# Patient Record
Sex: FEMALE | Race: WHITE | NOT HISPANIC OR LATINO | Employment: FULL TIME | URBAN - METROPOLITAN AREA
[De-identification: names, ages, dates, MRNs, and addresses within clinical notes are randomized per-mention and may not be internally consistent; named-entity substitution may affect disease eponyms.]

---

## 2023-04-01 DIAGNOSIS — G43.909 MIGRAINE, UNSPECIFIED, NOT INTRACTABLE, WITHOUT STATUS MIGRAINOSUS: ICD-10-CM

## 2023-04-04 RX ORDER — PROPRANOLOL HYDROCHLORIDE 60 MG/1
TABLET ORAL
Qty: 90 TABLET | Refills: 2 | Status: SHIPPED | OUTPATIENT
Start: 2023-04-04 | End: 2023-04-19 | Stop reason: SDUPTHER

## 2023-04-19 ENCOUNTER — OFFICE VISIT (OUTPATIENT)
Dept: PRIMARY CARE | Facility: CLINIC | Age: 48
End: 2023-04-19
Payer: COMMERCIAL

## 2023-04-19 DIAGNOSIS — K21.9 GASTROESOPHAGEAL REFLUX DISEASE WITHOUT ESOPHAGITIS: ICD-10-CM

## 2023-04-19 DIAGNOSIS — J32.0 CHRONIC MAXILLARY SINUSITIS: ICD-10-CM

## 2023-04-19 DIAGNOSIS — G43.009 MIGRAINE WITHOUT AURA AND WITHOUT STATUS MIGRAINOSUS, NOT INTRACTABLE: Primary | ICD-10-CM

## 2023-04-19 DIAGNOSIS — G43.909 MIGRAINE, UNSPECIFIED, NOT INTRACTABLE, WITHOUT STATUS MIGRAINOSUS: ICD-10-CM

## 2023-04-19 PROBLEM — F41.9 ANXIETY: Status: ACTIVE | Noted: 2023-04-19

## 2023-04-19 PROBLEM — J32.9 CHRONIC SINUSITIS: Status: ACTIVE | Noted: 2023-04-19

## 2023-04-19 PROCEDURE — 99212 OFFICE O/P EST SF 10 MIN: CPT | Performed by: INTERNAL MEDICINE

## 2023-04-19 RX ORDER — SERTRALINE HYDROCHLORIDE 25 MG/1
25 TABLET, FILM COATED ORAL DAILY
COMMUNITY
Start: 2023-02-20 | End: 2023-04-19 | Stop reason: ALTCHOICE

## 2023-04-19 RX ORDER — RIMEGEPANT SULFATE 75 MG/75MG
TABLET, ORALLY DISINTEGRATING ORAL
COMMUNITY
Start: 2022-06-08

## 2023-04-19 RX ORDER — ESTRADIOL 0.04 MG/D
PATCH, EXTENDED RELEASE TRANSDERMAL
COMMUNITY
Start: 2019-03-04

## 2023-04-19 RX ORDER — PANTOPRAZOLE SODIUM 40 MG/1
1 TABLET, DELAYED RELEASE ORAL
COMMUNITY
Start: 2022-12-07

## 2023-04-19 RX ORDER — PROPRANOLOL HYDROCHLORIDE 20 MG/1
10 TABLET ORAL DAILY
Qty: 15 TABLET | Refills: 2 | Status: SHIPPED | OUTPATIENT
Start: 2023-04-19 | End: 2023-06-20 | Stop reason: SINTOL

## 2023-04-19 NOTE — PROGRESS NOTES
Subjective   Patient ID: Shyann Huerta is a 48 y.o. female who presents for a follow-up visit.    HPI   The patient reports a history of a near constant pressure tightness in the right breast region x3 weeks up until the past few days.  She reports that the discomfort occurred with any movement as well as with inspiration.  She reports that the discomfort was not affected by oral intake.  She reports no radiation of discomfort.  She reports no associated diaphoresis, shortness of breath, nausea, vomiting, belching, presyncope.    The patient reports that she continues to have to use Excedrin Migraine once or twice a day so that she does not have daily headaches.  She discontinued propranolol several weeks ago and never started Nurtec that Dr. Colon recommended..  She also weaned herself off of Zoloft within the past several weeks.  Review of Systems    Objective   There were no vitals taken for this visit.    Physical Exam  Lungs-clear  Cardiac-rate normal, rhythm regular, no murmurs, no JVD  Musculoskeletal  Chest-no erythema or swelling.  Full range of motion in all directions of motion with reproduction of the patient's chest discomfort to a mild extent on rotation to the left.  Full range of motion with no reproduction of discomfort in all other directions of motion.  Palpation revealed no tenderness or increase in warmth  Assessment/Plan        Assessment  History of near constant pressure tightness in the right breast region-may be secondary to neuromuscular chest discomfort.  Chronic daily headache  Anxiety  Plan  I did ask the patient to begin use of Nurtec 75 mg p.o. every other day  I told the patient to restart propranolol but at a dose of 10 mg every evening.  I told the patient that she should use Advil 600 mg p.o. every 6 hours as needed pain or headache and that she should discontinue Excedrin Migraine.  The patient will return for a follow-up visit in 1 month

## 2023-04-21 ENCOUNTER — APPOINTMENT (OUTPATIENT)
Dept: PRIMARY CARE | Facility: CLINIC | Age: 48
End: 2023-04-21
Payer: COMMERCIAL

## 2023-04-21 DIAGNOSIS — G43.909 MIGRAINE, UNSPECIFIED, NOT INTRACTABLE, WITHOUT STATUS MIGRAINOSUS: ICD-10-CM

## 2023-04-21 RX ORDER — PROPRANOLOL HYDROCHLORIDE 10 MG/1
10 TABLET ORAL 3 TIMES DAILY
COMMUNITY
End: 2023-04-21 | Stop reason: SDUPTHER

## 2023-04-21 RX ORDER — PROPRANOLOL HYDROCHLORIDE 10 MG/1
10 TABLET ORAL 3 TIMES DAILY
Qty: 90 TABLET | Refills: 1 | Status: SHIPPED | OUTPATIENT
Start: 2023-04-21 | End: 2023-05-16

## 2023-05-16 DIAGNOSIS — G43.909 MIGRAINE, UNSPECIFIED, NOT INTRACTABLE, WITHOUT STATUS MIGRAINOSUS: ICD-10-CM

## 2023-05-16 RX ORDER — PROPRANOLOL HYDROCHLORIDE 10 MG/1
TABLET ORAL
Qty: 90 TABLET | Refills: 1 | Status: SHIPPED | OUTPATIENT
Start: 2023-05-16 | End: 2023-06-16

## 2023-06-13 DIAGNOSIS — G43.909 MIGRAINE, UNSPECIFIED, NOT INTRACTABLE, WITHOUT STATUS MIGRAINOSUS: ICD-10-CM

## 2023-06-15 NOTE — PROGRESS NOTES
Subjective   Patient ID: Shyann Huerta is a 48 y.o. female who presents for follow-up visit    HPI   The patient reports that she developed significant anxiety 3 to 4 weeks after her last office visit.  As a result, she restarted Zoloft at a dose of 12.5 mg daily and increase the dosage to 25 mg daily 1.5 weeks ago.  She reports a dramatic decrease in anxiety since restarting the Zoloft.  She still reports that she has to take 2 Excedrin migraines per day to prevent headache.  She did not begin use of Nurtec but is using propranolol 10 mg daily.  Review of Systems    Objective   There were no vitals taken for this visit.    Physical Exam  Neurologic  Mental status-alert and oriented x3   Cranial nerves-2 through 12 grossly intact, no visual field abnormalities  Motor-no pronator drift noted, strength-5/5 in all muscle groups tested, , no tremor noted.  No bradykinesia noted.  No rigidity noted.  Negative pull test  Sensory-Light touch sensation fully intact  Pinprick sensation fully intact  Vibratory sensation fully intact  Cerebellar-no truncal ataxia, good coordination finger-nose testing,, good coordination heel-to-shin testing, normal rapid alternating movements  Romberg negative, no abnormality in tandem gait  Reflexes-2+/4 bilaterally except left ankle jerk 1+/4  Assessment/Plan     Assessment  Allergic rhinitis  Chronic sinusitis  Gastroesophageal reflux  Gastritis December 2020  Acne vulgaris  Seborrheic dermatitis  Mild acne rosacea  Chronic daily headache  Anxiety.  Depression    Plan  I have urged the patient to begin use of Nurtec 75 mg p.o. every other day and I told her to try to limit her Excedrin Migraine usage to 1 tablet daily at least for the next 4 weeks.  I told her to continue use of Zoloft at a dose of 25 mg daily.  I told the patient to call me in 1 month with her condition and she will keep her regular scheduled complete physical examination in August

## 2023-06-16 RX ORDER — PROPRANOLOL HYDROCHLORIDE 10 MG/1
TABLET ORAL
Qty: 270 TABLET | Refills: 1 | Status: SHIPPED | OUTPATIENT
Start: 2023-06-16 | End: 2023-12-18

## 2023-06-20 ENCOUNTER — OFFICE VISIT (OUTPATIENT)
Dept: PRIMARY CARE | Facility: CLINIC | Age: 48
End: 2023-06-20
Payer: COMMERCIAL

## 2023-06-20 VITALS
WEIGHT: 148.13 LBS | BODY MASS INDEX: 27.09 KG/M2 | HEART RATE: 72 BPM | DIASTOLIC BLOOD PRESSURE: 84 MMHG | SYSTOLIC BLOOD PRESSURE: 110 MMHG

## 2023-06-20 DIAGNOSIS — F41.9 ANXIETY: ICD-10-CM

## 2023-06-20 DIAGNOSIS — G43.009 MIGRAINE WITHOUT AURA AND WITHOUT STATUS MIGRAINOSUS, NOT INTRACTABLE: Primary | ICD-10-CM

## 2023-06-20 PROCEDURE — 99212 OFFICE O/P EST SF 10 MIN: CPT | Performed by: INTERNAL MEDICINE

## 2023-06-20 RX ORDER — SERTRALINE HYDROCHLORIDE 25 MG/1
25 TABLET, FILM COATED ORAL DAILY
COMMUNITY
Start: 2023-05-16 | End: 2023-11-15 | Stop reason: ALTCHOICE

## 2023-06-28 ENCOUNTER — APPOINTMENT (OUTPATIENT)
Dept: PRIMARY CARE | Facility: CLINIC | Age: 48
End: 2023-06-28
Payer: COMMERCIAL

## 2023-08-03 PROBLEM — M54.59 ACUTE MECHANICAL LOW BACK PAIN WITH DURATION OF LESS THAN SIX WEEKS: Status: ACTIVE | Noted: 2023-08-03

## 2023-08-03 NOTE — PROGRESS NOTES
Subjective   Patient ID: Shyann Huerta is a 48 y.o. female who presents for CPE    HPI   The patient reports experiencing daily episodes of aching pain in the forehead region times years..  She reports that the episodes usually resolve 30 minutes after she takes Excedrin and possibly 30 minutes after she takes Nurtec which she is taking on an every other day basis.  She reports that the episodes have been precipitated and increased in intensity with use of alcohol, chocolate, not eating.  She reports no associated visual changes, slurred speech, focal weakness/paresthesias, nausea, vomiting, phonophobia, photophobia.  She does report an increase in the frequency of the episodes since she discontinued propranolol soon after her last office visit.  The patient discontinued propranolol because of a history of severe fatigue for period of several months.  She does report some improvement in her energy level since she discontinued the propranolol after her last office visit..  She had been taking propranolol at a dose of 10 mg p.o. nightly    The patient reports intermittent episodes of sharp aching pain in the right lower back region x6 to 12 months.  She reports that the episodes only occur when she is lying in bed.  She reports no radiation of discomfort.  She reports no right lower extremity weakness/paresthesias.  No other associated symptoms.  The patient did visit the [facility] in late June and was diagnosed with mechanical lower back pain.  It was recommended that she begin a physical therapy program which she has not done yet.    The patient reports a long history of occasional episodes of intense discomfort in the chest region.  She reports that the episodes can occur at rest, with activity, sometimes after oral intake.  She reports that the episodes resolve soon after she takes 1 dose of pantoprazole.  She reports no radiation of discomfort and no other associated symptoms.  The patient on  average will experience 1 episode every few months.    No other new complaints.  Review of Systems  All systems have been reviewed and are normal except as previously noted  Objective   There were no vitals taken for this visit.    Physical Exam  Head-palpation revealed no tenderness over the maxillary or frontal sinuses  Eyes-extraocular movements intact pupils equal and reactive to light; fundi revealed good retinal color no hemorrhages or exudates  Ears-palpation ofpinnas and tragusesrevealed no tenderness. External auditory canals not erythematous or swollen. TMs clear.      Nose-turbinates not erythematous or swollen; nasal septal deviation noted to the right  Mouth-xerostomia noted.  Posterior pharynx is not erythematous or swollen. Tonsillar pillars appeared normal no exudates  Neck no lymphadenopathy. Thyroid gland not enlarged. , No bruits  Breasts-no asymmetry or nipple discharge noted.  Palpation revealed no tenderness or masses, no axillary lymphadenopathy noted.  Lungs-clear to auscultation bilaterally  Cardiac-rate normal rhythm regular no murmurs no JVD  Abdomen-soft nondistended normal active bowel sounds. Palpation revealed no tenderness or masses  Pulses 2+ bilaterally in the upper extremities, 0 bilaterally in the lower extremities  Extremities-no peripheral edema    Musculoskeletal  Lumbar spine-mild scoliosis noted.  No erythema or swelling noted.  Full range of motion with mild discomfort noted on extension.  Full range of motion with no pain noted in all other directions of motion.  Palpation revealed no tenderness or increase in warmth  Mental status-alert and oriented x3   Cranial nerves-2 through 12 grossly intact, no visual field abnormalities  Motor-no pronator drift noted, strength-5/5 in all muscle groups tested, , no tremor noted.  No bradykinesia noted.  No rigidity noted.  Negative pull test  Sensory-Light touch sensation fully intact  Pinprick sensation fully intact  Vibratory  sensation fully intact  Cerebellar-no truncal ataxia, good coordination finger-nose testing,, good coordination heel-to-shin testing, normal rapid alternating movements  Romberg negative, no abnormality in tandem gait  Reflexes-ankle jerks 2+/4 bilaterally; all other reflexes tested 3+/4  Assessment/Plan        Assessment  Long history of occasional episodes of discomfort in the central chest region-may be secondary to gastroesophageal reflux.  Allergic rhinitis  Chronic sinusitis  Rhinoplasty with septal repair May 21, 2015  Gastroesophageal reflux  Gastritis December 2020  Tubular adenoma August 2015  Uterine leiomyoma-status post total abdominal hysterectomy with salpingo-oophorectomy January 8, 2014  Varicose veins  Acne vulgaris  Seborrheic dermatitis  Mild acne rosacea  Generalized fatigue-unsure of etiology.  May be secondary to depression, anxiety.  Anemia and thyroid disease must be ruled out.  Administration of propranolol was probably playing a role.  Long history of daily episodes of aching pain in the forehead region-likely secondary to chronic daily headache  Chronic daily headache  Migraine headaches  Rebound analgesic headaches  Right-sided occipital neuralgia  Intermittent episodes of sharp aching pain in the right lower back region-May be secondary to scoliosis, lumbosacral strain,  Anxiety  Depression  Plan  Obtain urinalysis, CBC differential, CMP, fasting lipid profile, TSH, vitamin D level, vitamin B12 level, cardiac CRP is soon as possible.  I told the patient to schedule a surveillance mammogram within the next year.  I told the patient to obtain 2 doses of Shingrix over the next 6 to 12 months.  I recommended that the patient obtain a new mattress  I have recommended that she restart propranolol but at a dose of 5 mg p.o. nightly.  The patient will return for a follow-up visit in 7 weeks

## 2023-08-04 ENCOUNTER — OFFICE VISIT (OUTPATIENT)
Dept: PRIMARY CARE | Facility: CLINIC | Age: 48
End: 2023-08-04
Payer: COMMERCIAL

## 2023-08-04 VITALS
HEART RATE: 84 BPM | WEIGHT: 153 LBS | BODY MASS INDEX: 27.98 KG/M2 | SYSTOLIC BLOOD PRESSURE: 104 MMHG | DIASTOLIC BLOOD PRESSURE: 80 MMHG

## 2023-08-04 DIAGNOSIS — J32.0 CHRONIC MAXILLARY SINUSITIS: ICD-10-CM

## 2023-08-04 DIAGNOSIS — G43.709 CHRONIC MIGRAINE WITHOUT AURA WITHOUT STATUS MIGRAINOSUS, NOT INTRACTABLE: ICD-10-CM

## 2023-08-04 DIAGNOSIS — K21.9 GASTROESOPHAGEAL REFLUX DISEASE WITHOUT ESOPHAGITIS: ICD-10-CM

## 2023-08-04 DIAGNOSIS — Z00.00 ROUTINE GENERAL MEDICAL EXAMINATION AT A HEALTH CARE FACILITY: Primary | ICD-10-CM

## 2023-08-04 DIAGNOSIS — M54.59 ACUTE MECHANICAL LOW BACK PAIN WITH DURATION OF LESS THAN SIX WEEKS: ICD-10-CM

## 2023-08-04 LAB
POC APPEARANCE, URINE: CLEAR
POC BILIRUBIN, URINE: NEGATIVE
POC BLOOD, URINE: NEGATIVE
POC COLOR, URINE: YELLOW
POC GLUCOSE, URINE: NEGATIVE MG/DL
POC KETONES, URINE: NEGATIVE MG/DL
POC LEUKOCYTES, URINE: NEGATIVE
POC NITRITE,URINE: NEGATIVE
POC PH, URINE: 5.5 PH
POC PROTEIN, URINE: NEGATIVE MG/DL
POC SPECIFIC GRAVITY, URINE: >=1.03
POC UROBILINOGEN, URINE: 0.2 EU/DL

## 2023-08-04 PROCEDURE — 99396 PREV VISIT EST AGE 40-64: CPT | Performed by: INTERNAL MEDICINE

## 2023-08-04 PROCEDURE — 81002 URINALYSIS NONAUTO W/O SCOPE: CPT | Performed by: INTERNAL MEDICINE

## 2023-08-04 PROCEDURE — 99213 OFFICE O/P EST LOW 20 MIN: CPT | Performed by: INTERNAL MEDICINE

## 2023-08-04 RX ORDER — MULTIVITAMIN
1 TABLET ORAL DAILY
COMMUNITY
End: 2023-12-27 | Stop reason: ALTCHOICE

## 2023-08-14 ENCOUNTER — LAB (OUTPATIENT)
Dept: LAB | Facility: LAB | Age: 48
End: 2023-08-14
Payer: COMMERCIAL

## 2023-08-14 DIAGNOSIS — G43.709 CHRONIC MIGRAINE WITHOUT AURA WITHOUT STATUS MIGRAINOSUS, NOT INTRACTABLE: ICD-10-CM

## 2023-08-14 LAB
ALANINE AMINOTRANSFERASE (SGPT) (U/L) IN SER/PLAS: 18 U/L (ref 7–45)
ALBUMIN (G/DL) IN SER/PLAS: 4.2 G/DL (ref 3.4–5)
ALKALINE PHOSPHATASE (U/L) IN SER/PLAS: 80 U/L (ref 33–110)
ANION GAP IN SER/PLAS: 12 MMOL/L (ref 10–20)
ASPARTATE AMINOTRANSFERASE (SGOT) (U/L) IN SER/PLAS: 22 U/L (ref 9–39)
BASOPHILS (10*3/UL) IN BLOOD BY AUTOMATED COUNT: 0.05 X10E9/L (ref 0–0.1)
BASOPHILS/100 LEUKOCYTES IN BLOOD BY AUTOMATED COUNT: 0.8 % (ref 0–2)
BILIRUBIN TOTAL (MG/DL) IN SER/PLAS: 0.4 MG/DL (ref 0–1.2)
C REACTIVE PROTEIN (MG/L) IN SER/PLAS BY HIGH SENSIT: 7.2 MG/L
CALCIDIOL (25 OH VITAMIN D3) (NG/ML) IN SER/PLAS: 24 NG/ML
CALCIUM (MG/DL) IN SER/PLAS: 8.8 MG/DL (ref 8.6–10.3)
CARBON DIOXIDE, TOTAL (MMOL/L) IN SER/PLAS: 27 MMOL/L (ref 21–32)
CHLORIDE (MMOL/L) IN SER/PLAS: 103 MMOL/L (ref 98–107)
CHOLESTEROL (MG/DL) IN SER/PLAS: 255 MG/DL (ref 0–199)
CHOLESTEROL IN HDL (MG/DL) IN SER/PLAS: 87.6 MG/DL
CHOLESTEROL/HDL RATIO: 2.9
COBALAMIN (VITAMIN B12) (PG/ML) IN SER/PLAS: 961 PG/ML (ref 211–911)
CREATININE (MG/DL) IN SER/PLAS: 0.66 MG/DL (ref 0.5–1.05)
EOSINOPHILS (10*3/UL) IN BLOOD BY AUTOMATED COUNT: 0.19 X10E9/L (ref 0–0.7)
EOSINOPHILS/100 LEUKOCYTES IN BLOOD BY AUTOMATED COUNT: 2.9 % (ref 0–6)
ERYTHROCYTE DISTRIBUTION WIDTH (RATIO) BY AUTOMATED COUNT: 13.6 % (ref 11.5–14.5)
ERYTHROCYTE MEAN CORPUSCULAR HEMOGLOBIN CONCENTRATION (G/DL) BY AUTOMATED: 32.1 G/DL (ref 32–36)
ERYTHROCYTE MEAN CORPUSCULAR VOLUME (FL) BY AUTOMATED COUNT: 91 FL (ref 80–100)
ERYTHROCYTES (10*6/UL) IN BLOOD BY AUTOMATED COUNT: 4.36 X10E12/L (ref 4–5.2)
GFR FEMALE: >90 ML/MIN/1.73M2
GLUCOSE (MG/DL) IN SER/PLAS: 87 MG/DL (ref 74–99)
HEMATOCRIT (%) IN BLOOD BY AUTOMATED COUNT: 39.6 % (ref 36–46)
HEMOGLOBIN (G/DL) IN BLOOD: 12.7 G/DL (ref 12–16)
IMMATURE GRANULOCYTES/100 LEUKOCYTES IN BLOOD BY AUTOMATED COUNT: 0.2 % (ref 0–0.9)
LDL: 151 MG/DL (ref 0–99)
LEUKOCYTES (10*3/UL) IN BLOOD BY AUTOMATED COUNT: 6.7 X10E9/L (ref 4.4–11.3)
LYMPHOCYTES (10*3/UL) IN BLOOD BY AUTOMATED COUNT: 1.97 X10E9/L (ref 1.2–4.8)
LYMPHOCYTES/100 LEUKOCYTES IN BLOOD BY AUTOMATED COUNT: 29.6 % (ref 13–44)
MONOCYTES (10*3/UL) IN BLOOD BY AUTOMATED COUNT: 0.48 X10E9/L (ref 0.1–1)
MONOCYTES/100 LEUKOCYTES IN BLOOD BY AUTOMATED COUNT: 7.2 % (ref 2–10)
NEUTROPHILS (10*3/UL) IN BLOOD BY AUTOMATED COUNT: 3.95 X10E9/L (ref 1.2–7.7)
NEUTROPHILS/100 LEUKOCYTES IN BLOOD BY AUTOMATED COUNT: 59.3 % (ref 40–80)
PLATELETS (10*3/UL) IN BLOOD AUTOMATED COUNT: 408 X10E9/L (ref 150–450)
POTASSIUM (MMOL/L) IN SER/PLAS: 5.1 MMOL/L (ref 3.5–5.3)
PROTEIN TOTAL: 6.9 G/DL (ref 6.4–8.2)
SODIUM (MMOL/L) IN SER/PLAS: 137 MMOL/L (ref 136–145)
THYROTROPIN (MIU/L) IN SER/PLAS BY DETECTION LIMIT <= 0.05 MIU/L: 1.44 MIU/L (ref 0.44–3.98)
TRIGLYCERIDE (MG/DL) IN SER/PLAS: 80 MG/DL (ref 0–149)
UREA NITROGEN (MG/DL) IN SER/PLAS: 26 MG/DL (ref 6–23)
VLDL: 16 MG/DL (ref 0–40)

## 2023-08-14 PROCEDURE — 84443 ASSAY THYROID STIM HORMONE: CPT

## 2023-08-14 PROCEDURE — 86141 C-REACTIVE PROTEIN HS: CPT

## 2023-08-14 PROCEDURE — 82306 VITAMIN D 25 HYDROXY: CPT

## 2023-08-14 PROCEDURE — 36415 COLL VENOUS BLD VENIPUNCTURE: CPT

## 2023-08-14 PROCEDURE — 80053 COMPREHEN METABOLIC PANEL: CPT

## 2023-08-14 PROCEDURE — 85025 COMPLETE CBC W/AUTO DIFF WBC: CPT

## 2023-08-14 PROCEDURE — 82607 VITAMIN B-12: CPT

## 2023-08-14 PROCEDURE — 80061 LIPID PANEL: CPT

## 2023-11-15 DIAGNOSIS — F41.9 ANXIETY: ICD-10-CM

## 2023-11-15 RX ORDER — SERTRALINE HYDROCHLORIDE 50 MG/1
50 TABLET, FILM COATED ORAL
COMMUNITY
Start: 2022-04-11 | End: 2023-11-15 | Stop reason: SDUPTHER

## 2023-11-15 RX ORDER — SERTRALINE HYDROCHLORIDE 50 MG/1
50 TABLET, FILM COATED ORAL
Qty: 90 TABLET | Refills: 3 | Status: SHIPPED | OUTPATIENT
Start: 2023-11-15

## 2023-11-20 ENCOUNTER — APPOINTMENT (OUTPATIENT)
Dept: DERMATOLOGY | Facility: CLINIC | Age: 48
End: 2023-11-20
Payer: COMMERCIAL

## 2023-12-01 ENCOUNTER — OFFICE VISIT (OUTPATIENT)
Dept: DERMATOLOGY | Facility: CLINIC | Age: 48
End: 2023-12-01
Payer: COMMERCIAL

## 2023-12-01 DIAGNOSIS — L73.8 SEBACEOUS HYPERPLASIA OF FACE: ICD-10-CM

## 2023-12-01 DIAGNOSIS — L72.0 MILIA: ICD-10-CM

## 2023-12-01 PROCEDURE — 99203 OFFICE O/P NEW LOW 30 MIN: CPT | Performed by: SPECIALIST

## 2023-12-01 PROCEDURE — 17111 DESTRUCTION B9 LESIONS 15/>: CPT | Performed by: SPECIALIST

## 2023-12-01 NOTE — PROGRESS NOTES
Subjective     Shyann Huerta is a 48 y.o. female who presents for the following: New Patient Visit.     Review of Systems:  No other skin or systemic complaints other than what is documented elsewhere in the note.    The following portions of the chart were reviewed this encounter and updated as appropriate:          Skin Cancer History  No skin cancer on file.      Specialty Problems    None       Objective   Well appearing patient in no apparent distress; mood and affect are within normal limits.    A focused skin examination was performed. All findings within normal limits unless otherwise noted below.    Assessment/Plan   1. Sebaceous hyperplasia of face  Head - Anterior (Face)    2. Milia  Right Medial [place]

## 2023-12-18 DIAGNOSIS — G43.909 MIGRAINE, UNSPECIFIED, NOT INTRACTABLE, WITHOUT STATUS MIGRAINOSUS: ICD-10-CM

## 2023-12-18 RX ORDER — PROPRANOLOL HYDROCHLORIDE 10 MG/1
TABLET ORAL
Qty: 270 TABLET | Refills: 1 | Status: SHIPPED | OUTPATIENT
Start: 2023-12-18 | End: 2023-12-27 | Stop reason: ALTCHOICE

## 2023-12-24 NOTE — PROGRESS NOTES
Subjective   Patient ID: Shyann Huerta is a 48 y.o. female who presents for follow-up visit    HPI   Since receiving 1 dose of tirzepatide 1 week ago, the patient has noted a dramatic decrease in the frequency and intensity of chronic intermittent episodes of aching pain in the forehead region.  She reports that the episodes still resolved 30 minutes after she uses Excedrin or Nurtec.  Over the past week, she has been using the Nurtec on a as needed basis.  She reports no recent episodes of discomfort in the central chest region.  Over the past 4.5 months, she reports an increase in her energy level.  She reports no other new complaints.  Note that the patient did not take propranolol at bedtime as I had recommended at the time of her complete physical examination.  Review of Systems    Objective   There were no vitals taken for this visit.    Physical Exam  Head-palpation revealed no tenderness over the maxillary or frontal sinuses  Nose-turbinates not erythematous or swollen; mild nasal septal deviation noted to the right  Mouth-  Posterior pharynx is not erythematous or swollen. Tonsillar pillars appeared normal no exudates  Neck no lymphadenopathy. Thyroid gland not enlarged. , No bruits  Lungs-clear to auscultation bilaterally  Cardiac-rate normal rhythm regular no murmurs no JVD  Abdomen-soft nondistended normal active bowel sounds. Palpation revealed no tenderness or masses  Pulses 2+ bilaterally in the upper extremities, 0 bilaterally in the lower extremities  Extremities-no peripheral edema  Assessment/Plan         Assessment  Elevated high-sensitivity CRP-unsure of etiology  Chronic occasional episodes of discomfort in the central chest region-may be secondary to gastroesophageal reflux.  Allergic rhinitis  Chronic sinusitis  Rhinoplasty with septal repair May 21, 2015  Gastroesophageal reflux  Gastritis December 2020  Tubular adenoma August 2015  Uterine leiomyoma-status post total abdominal hysterectomy  with salpingo-oophorectomy January 8, 2014  Vitamin D deficiency  Varicose veins  Acne vulgaris  Seborrheic dermatitis  Mild acne rosacea  Sebaceous hyperplasia of the face  Milia right medial canthus  Chronic generalized fatigue-unsure of etiology.  May be secondary to depression, anxiety.  Anemia and thyroid disease must be ruled out.  Administration of propranolol was probably playing a role.  Chronic intermittent episodes of aching pain in the forehead region-likely secondary to chronic daily headache  Chronic daily headache  Migraine headaches  Rebound analgesic headaches  Right-sided occipital neuralgia  Chronic intermittent episodes of sharp aching pain in the right lower back region-May be secondary to scoliosis, lumbosacral strain,  Anxiety  Depression    Plan  Obtain repeat vitamin D level and high-sensitivity CRP today  Continue all other current medications for now.  The patient has been receiving tirzapatide from a different clinician and I suggested that she continue to receive the injection from this clinician.  I told the patient to call me if she should experience any side effects of abdominal pain, nausea etc.  I did again recommend that the patient begin a physical therapy program secondary to the chronic intermittent episodes of sharp aching pain in the right lower back region.  The patient is declining at this time as again she is only experiencing the episodes when lying in bed.  I also recommended that she look into obtaining a new mattress.  The patient will return for a complete physical examination on or after August 4, 2024

## 2023-12-27 ENCOUNTER — APPOINTMENT (OUTPATIENT)
Dept: PRIMARY CARE | Facility: CLINIC | Age: 48
End: 2023-12-27
Payer: COMMERCIAL

## 2023-12-27 ENCOUNTER — LAB (OUTPATIENT)
Dept: LAB | Facility: LAB | Age: 48
End: 2023-12-27
Payer: COMMERCIAL

## 2023-12-27 ENCOUNTER — OFFICE VISIT (OUTPATIENT)
Dept: PRIMARY CARE | Facility: CLINIC | Age: 48
End: 2023-12-27
Payer: COMMERCIAL

## 2023-12-27 VITALS
WEIGHT: 138 LBS | BODY MASS INDEX: 25.24 KG/M2 | HEART RATE: 70 BPM | SYSTOLIC BLOOD PRESSURE: 110 MMHG | DIASTOLIC BLOOD PRESSURE: 80 MMHG

## 2023-12-27 DIAGNOSIS — R79.82 ELEVATED HIGH SENSITIVITY C-REACTIVE PROTEIN: ICD-10-CM

## 2023-12-27 DIAGNOSIS — M54.59 ACUTE MECHANICAL LOW BACK PAIN WITH DURATION OF LESS THAN SIX WEEKS: ICD-10-CM

## 2023-12-27 DIAGNOSIS — G43.009 MIGRAINE WITHOUT AURA AND WITHOUT STATUS MIGRAINOSUS, NOT INTRACTABLE: ICD-10-CM

## 2023-12-27 DIAGNOSIS — E55.9 VITAMIN D DEFICIENCY: ICD-10-CM

## 2023-12-27 DIAGNOSIS — R79.82 ELEVATED HIGH SENSITIVITY C-REACTIVE PROTEIN: Primary | ICD-10-CM

## 2023-12-27 LAB
25(OH)D3 SERPL-MCNC: 23 NG/ML (ref 30–100)
CRP SERPL HS-MCNC: 11.7 MG/L

## 2023-12-27 PROCEDURE — 36415 COLL VENOUS BLD VENIPUNCTURE: CPT

## 2023-12-27 PROCEDURE — 86141 C-REACTIVE PROTEIN HS: CPT

## 2023-12-27 PROCEDURE — 82306 VITAMIN D 25 HYDROXY: CPT

## 2023-12-27 PROCEDURE — 99213 OFFICE O/P EST LOW 20 MIN: CPT | Performed by: INTERNAL MEDICINE

## 2023-12-28 DIAGNOSIS — R79.82 ELEVATED HIGH SENSITIVITY C-REACTIVE PROTEIN: Primary | ICD-10-CM

## 2023-12-28 NOTE — PROGRESS NOTES
Labs reviewed.  I have asked the patient to begin use of vitamin D 9276-0144 units daily.  I also will obtain a CT calcium score test because of her persistently elevated high-sensitivity CRP

## 2024-01-04 ENCOUNTER — OFFICE VISIT (OUTPATIENT)
Dept: DERMATOLOGY | Facility: CLINIC | Age: 49
End: 2024-01-04
Payer: COMMERCIAL

## 2024-01-04 ENCOUNTER — LAB (OUTPATIENT)
Dept: LAB | Facility: LAB | Age: 49
End: 2024-01-04
Payer: COMMERCIAL

## 2024-01-04 DIAGNOSIS — L73.8 SEBACEOUS HYPERPLASIA OF FACE: ICD-10-CM

## 2024-01-04 DIAGNOSIS — L70.0 ACNE VULGARIS: Primary | ICD-10-CM

## 2024-01-04 LAB
ALBUMIN SERPL BCP-MCNC: 4.4 G/DL (ref 3.4–5)
ALP SERPL-CCNC: 73 U/L (ref 33–110)
ALT SERPL W P-5'-P-CCNC: 11 U/L (ref 7–45)
ANION GAP SERPL CALC-SCNC: 14 MMOL/L (ref 10–20)
AST SERPL W P-5'-P-CCNC: 15 U/L (ref 9–39)
B-HCG SERPL-ACNC: 4 MIU/ML
BASOPHILS # BLD AUTO: 0.05 X10*3/UL (ref 0–0.1)
BASOPHILS NFR BLD AUTO: 0.9 %
BILIRUB SERPL-MCNC: 0.5 MG/DL (ref 0–1.2)
BUN SERPL-MCNC: 12 MG/DL (ref 6–23)
CALCIUM SERPL-MCNC: 9.7 MG/DL (ref 8.6–10.6)
CHLORIDE SERPL-SCNC: 102 MMOL/L (ref 98–107)
CO2 SERPL-SCNC: 26 MMOL/L (ref 21–32)
CREAT SERPL-MCNC: 0.82 MG/DL (ref 0.5–1.05)
EOSINOPHIL # BLD AUTO: 0.12 X10*3/UL (ref 0–0.7)
EOSINOPHIL NFR BLD AUTO: 2.1 %
ERYTHROCYTE [DISTWIDTH] IN BLOOD BY AUTOMATED COUNT: 12.7 % (ref 11.5–14.5)
GFR SERPL CREATININE-BSD FRML MDRD: 88 ML/MIN/1.73M*2
GLUCOSE SERPL-MCNC: 84 MG/DL (ref 74–99)
HCT VFR BLD AUTO: 40 % (ref 36–46)
HGB BLD-MCNC: 13.2 G/DL (ref 12–16)
IMM GRANULOCYTES # BLD AUTO: 0.01 X10*3/UL (ref 0–0.7)
IMM GRANULOCYTES NFR BLD AUTO: 0.2 % (ref 0–0.9)
LYMPHOCYTES # BLD AUTO: 2.14 X10*3/UL (ref 1.2–4.8)
LYMPHOCYTES NFR BLD AUTO: 38 %
MCH RBC QN AUTO: 29.4 PG (ref 26–34)
MCHC RBC AUTO-ENTMCNC: 33 G/DL (ref 32–36)
MCV RBC AUTO: 89 FL (ref 80–100)
MONOCYTES # BLD AUTO: 0.38 X10*3/UL (ref 0.1–1)
MONOCYTES NFR BLD AUTO: 6.7 %
NEUTROPHILS # BLD AUTO: 2.93 X10*3/UL (ref 1.2–7.7)
NEUTROPHILS NFR BLD AUTO: 52.1 %
NRBC BLD-RTO: 0 /100 WBCS (ref 0–0)
PLATELET # BLD AUTO: 383 X10*3/UL (ref 150–450)
POTASSIUM SERPL-SCNC: 4.2 MMOL/L (ref 3.5–5.3)
PROT SERPL-MCNC: 7.2 G/DL (ref 6.4–8.2)
RBC # BLD AUTO: 4.49 X10*6/UL (ref 4–5.2)
SODIUM SERPL-SCNC: 138 MMOL/L (ref 136–145)
WBC # BLD AUTO: 5.6 X10*3/UL (ref 4.4–11.3)

## 2024-01-04 PROCEDURE — 80053 COMPREHEN METABOLIC PANEL: CPT

## 2024-01-04 PROCEDURE — 36415 COLL VENOUS BLD VENIPUNCTURE: CPT

## 2024-01-04 PROCEDURE — 84702 CHORIONIC GONADOTROPIN TEST: CPT

## 2024-01-04 PROCEDURE — 85025 COMPLETE CBC W/AUTO DIFF WBC: CPT

## 2024-01-04 PROCEDURE — 99213 OFFICE O/P EST LOW 20 MIN: CPT | Performed by: SPECIALIST

## 2024-01-04 NOTE — PROGRESS NOTES
Subjective     Shyann Huerta is a 48 y.o. female who presents for the following: Seb Hyperplasia .     Review of Systems:  No other skin or systemic complaints other than what is documented elsewhere in the note.    The following portions of the chart were reviewed this encounter and updated as appropriate:          Skin Cancer History  No skin cancer on file.      Specialty Problems    None       Objective   Well appearing patient in no apparent distress; mood and affect are within normal limits.    A focused skin examination was performed. All findings within normal limits unless otherwise noted below.    Assessment/Plan   1. Sebaceous hyperplasia of face

## 2024-01-05 DIAGNOSIS — L70.0 ACNE VULGARIS: Primary | ICD-10-CM

## 2024-01-05 RX ORDER — ISOTRETINOIN 40 MG/1
40 CAPSULE ORAL
Qty: 30 CAPSULE | Refills: 0 | Status: SHIPPED | OUTPATIENT
Start: 2024-01-05 | End: 2024-01-08 | Stop reason: SDUPTHER

## 2024-01-05 NOTE — PROGRESS NOTES
Subjective   HPI: Shyann Huerta is a 48 y.o. female is here for evaluation and treatment of bumps on my forehead and face.    ROS: No other skin or systemic complaints other than what is documented elsewhere in the note.    ALLERGIES: Azithromycin and Erythromycin base    SOCIAL:  reports that she has never smoked. She has never used smokeless tobacco. No history on file for alcohol use and drug use.    Objective     Description: Patient has large numbers of slightly inflamed pearlescent papules involving her face.  Hyfrecation was somewhat effective however patient would like more aggressive therapy.  I discussed isotretinoin in detail.  I also discussed that this would not be a full dose.  I also discussed the possibility of recurrence and that the recurrence rate was not predictable.  The numerous side effects were also discussed.  Patient has had a total hysterectomy.    Assessment/Plan   1. Sebaceous hyperplasia of face         FOLLOW UP: 4 weeks.    The patient was encouraged to contact me with any further questions or concerns.  Micheal Rios MD  1/5/2024

## 2024-01-08 DIAGNOSIS — L70.0 ACNE VULGARIS: ICD-10-CM

## 2024-01-08 RX ORDER — ISOTRETINOIN 40 MG/1
40 CAPSULE ORAL
Qty: 30 CAPSULE | Refills: 0 | Status: SHIPPED | OUTPATIENT
Start: 2024-01-08 | End: 2024-02-05 | Stop reason: WASHOUT

## 2024-01-29 PROBLEM — L73.8 SEBACEOUS GLAND HYPERPLASIA: Status: ACTIVE | Noted: 2024-01-29

## 2024-01-29 PROBLEM — K59.00 CONSTIPATION: Status: ACTIVE | Noted: 2024-01-29

## 2024-01-29 PROBLEM — K63.5 BENIGN COLON POLYP: Status: ACTIVE | Noted: 2024-01-29

## 2024-01-29 PROBLEM — R51.9 CHRONIC DAILY HEADACHE: Status: ACTIVE | Noted: 2019-08-05

## 2024-01-29 PROBLEM — F41.9 ANXIETY AND DEPRESSION: Status: ACTIVE | Noted: 2023-04-19

## 2024-01-29 PROBLEM — H69.92 DYSFUNCTION OF LEFT EUSTACHIAN TUBE: Status: ACTIVE | Noted: 2024-01-29

## 2024-01-29 PROBLEM — M54.81 OCCIPITAL NEURALGIA OF RIGHT SIDE: Status: ACTIVE | Noted: 2019-08-05

## 2024-01-29 PROBLEM — K29.70 GASTRITIS: Status: ACTIVE | Noted: 2024-01-29

## 2024-01-29 PROBLEM — D22.5 MELANOCYTIC NEVI OF TRUNK: Status: ACTIVE | Noted: 2017-10-02

## 2024-01-29 PROBLEM — L72.0 MILIA: Status: ACTIVE | Noted: 2024-01-29

## 2024-01-29 PROBLEM — D64.9 ANEMIA: Status: ACTIVE | Noted: 2024-01-29

## 2024-01-29 PROBLEM — J30.9 ALLERGIC RHINITIS: Status: ACTIVE | Noted: 2024-01-29

## 2024-01-29 PROBLEM — E55.9 VITAMIN D DEFICIENCY: Status: ACTIVE | Noted: 2023-12-27

## 2024-01-29 PROBLEM — D22.71 MELANOCYTIC NEVI OF RIGHT LOWER LIMB, INCLUDING HIP: Status: ACTIVE | Noted: 2017-10-02

## 2024-01-29 PROBLEM — K21.00 GASTROESOPHAGEAL REFLUX DISEASE WITH ESOPHAGITIS: Status: ACTIVE | Noted: 2023-04-19

## 2024-01-29 PROBLEM — F32.A ANXIETY AND DEPRESSION: Status: ACTIVE | Noted: 2023-04-19

## 2024-01-29 RX ORDER — ONDANSETRON 4 MG/1
4 TABLET, FILM COATED ORAL EVERY 6 HOURS PRN
COMMUNITY
Start: 2023-12-20

## 2024-01-29 RX ORDER — METHOCARBAMOL 500 MG/1
TABLET, FILM COATED ORAL
COMMUNITY
Start: 2023-06-28

## 2024-01-29 RX ORDER — TOPIRAMATE 100 MG/1
1 TABLET, FILM COATED ORAL NIGHTLY
COMMUNITY
Start: 2024-01-24 | End: 2025-01-23

## 2024-01-29 RX ORDER — TRETINOIN 0.25 MG/G
CREAM TOPICAL
COMMUNITY
Start: 2023-11-06

## 2024-01-29 RX ORDER — FLUTICASONE PROPIONATE 50 MCG
SPRAY, SUSPENSION (ML) NASAL
COMMUNITY
Start: 2023-07-17

## 2024-01-29 RX ORDER — PROPRANOLOL HYDROCHLORIDE 60 MG/1
1 TABLET ORAL DAILY
COMMUNITY
Start: 2022-06-08

## 2024-01-29 RX ORDER — AMITRIPTYLINE HYDROCHLORIDE 10 MG/1
TABLET, FILM COATED ORAL
COMMUNITY
Start: 2021-01-05

## 2024-01-29 RX ORDER — AMOXICILLIN AND CLAVULANATE POTASSIUM 875; 125 MG/1; MG/1
2 TABLET, FILM COATED ORAL EVERY 12 HOURS
COMMUNITY

## 2024-01-29 RX ORDER — AZELASTINE 1 MG/ML
SPRAY, METERED NASAL
COMMUNITY
Start: 2023-06-12

## 2024-01-29 RX ORDER — DOXYCYCLINE HYCLATE 100 MG
100 TABLET ORAL 2 TIMES DAILY
COMMUNITY
Start: 2023-11-15

## 2024-01-29 RX ORDER — TAZAROTENE 0.45 MG/G
LOTION TOPICAL
COMMUNITY
Start: 2023-12-24

## 2024-01-29 RX ORDER — ALCLOMETASONE DIPROPIONATE 0.5 MG/G
CREAM TOPICAL
COMMUNITY
Start: 2023-07-16

## 2024-01-29 RX ORDER — ELETRIPTAN HYDROBROMIDE 40 MG/1
TABLET, FILM COATED ORAL
COMMUNITY
Start: 2022-05-25

## 2024-01-29 RX ORDER — DESLORATADINE 5 MG/1
10 TABLET ORAL DAILY
COMMUNITY
Start: 2023-09-03

## 2024-01-29 RX ORDER — BENZONATATE 100 MG/1
CAPSULE ORAL
COMMUNITY
Start: 2023-11-15

## 2024-01-29 RX ORDER — TOPIRAMATE 25 MG/1
TABLET ORAL
COMMUNITY
Start: 2024-01-24

## 2024-01-31 PROBLEM — Z12.39 BREAST CANCER SCREENING, HIGH RISK PATIENT: Status: ACTIVE | Noted: 2024-01-31

## 2024-01-31 PROBLEM — Z80.3 FAMILY HISTORY OF BREAST CANCER: Status: ACTIVE | Noted: 2024-01-31

## 2024-01-31 NOTE — PROGRESS NOTES
Subjective   HPI: Shyann Huerta is a 48 y.o. female is here for follow up evaluation and treatment of sebaceous hyperplasia being tx with oral isotretinoin.     ROS: No other skin or systemic complaints other than what is documented elsewhere in the note.    ALLERGIES: Macrolide antibiotics, Azithromycin, Cephalexin, and Erythromycin base    SOCIAL:  reports that she has never smoked. She has never used smokeless tobacco. No history on file for alcohol use and drug use.    Objective       Assessment/Plan        FOLLOW UP: ***    The patient was encouraged to contact me with any further questions or concerns.  Xin Fuentes PA-C  1/31/2024

## 2024-02-01 PROBLEM — Z13.71 BRCA GENE MUTATION NEGATIVE: Status: ACTIVE | Noted: 2024-02-01

## 2024-02-01 PROBLEM — Z80.3 FAMILY HISTORY OF MALIGNANT NEOPLASM OF BREAST: Status: ACTIVE | Noted: 2023-01-30

## 2024-02-01 PROBLEM — Z86.69 HISTORY OF CONJUNCTIVITIS: Status: ACTIVE | Noted: 2024-02-01

## 2024-02-01 PROBLEM — G44.40 MEDICATION OVERUSE HEADACHE: Status: ACTIVE | Noted: 2024-01-24

## 2024-02-01 PROBLEM — D22.62 MELANOCYTIC NEVI OF LEFT UPPER LIMB, INCLUDING SHOULDER: Status: ACTIVE | Noted: 2017-10-02

## 2024-02-01 PROBLEM — G43.719 CHRONIC MIGRAINE WITHOUT AURA, INTRACTABLE, WITHOUT STATUS MIGRAINOSUS: Status: ACTIVE | Noted: 2024-01-24

## 2024-02-01 NOTE — PROGRESS NOTES
Shyann Huerta female   1975 48 y.o.   [id number]      Chief Complaint    Follow-up          HPI  Shyann Huerta is a 48 y.o.   [facility]. She has history of benign left breast biopsy in the . She recently completed genetic testing and is negative for gene mutations. She has Ashkenazi Adventist descent & tested BRCA negative and Cancer Next Panel negative 2019. She had complete hysterectomy for fibroids (no atypia or malignancy) and has been on HRT for years. 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 She has significant family history of breast cancer with mom diagnosed age 58 BRCA-positive (unsure if [id number] BRCA2), maternal aunt age 38, maternal aunt age 78, maternal great aunt age 50, paternal aunt age 72, paternal grandmother age 65.     She went off HRT and did not feel well, had depression She returned to small dose of HRT/patch and feels much better. She is also on zoloft and doing well.      BREAST IMAGIN2023 Full protocol breast MRI, BI-RADS Category 2. 2023 bilateral screening mammogram normal, BI-RADS Category 13.      FEMALE HISTORY: Menarche age 10, , first birth age 34, OCPs x 15 years, surgical menopause age 38, HRT x 10 years and current user, estrogen only, 1 benign breast biopsy, scattered fibroglandular breast tissue     FAMILY CANCER HISTORY:  Mother: Breast cancer age 58, BRCA positive  Maternal aunt: Breast cancer age 38  Maternal aunt: breast cancer age 78  Maternal great aunt: Breast cancer age 50  Paternal aunt: breast cancer age 72  Paternal grandmother: breast cancer age 65       REVIEW OF SYSTEMS    Constitutional:  Negative for appetite change, fatigue, fever and unexpected weight change.   HENT:  Negative for ear pain, hearing loss, nosebleeds, sore throat and trouble swallowing.    Eyes:  Negative for discharge,  itching and visual disturbance.   Respiratory:  Negative for cough, chest tightness and shortness of breath.    Cardiovascular:  Negative for chest pain, palpitations and leg swelling.   Breast: as indicated in HPI  Gastrointestinal:  Negative for abdominal pain, constipation, diarrhea and nausea.   Endocrine: Negative for cold intolerance and heat intolerance.   Genitourinary:  Negative for dysuria, frequency, hematuria, pelvic pain and vaginal bleeding.   Musculoskeletal:  Negative for arthralgias, back pain, gait problem, joint swelling and myalgias.   Skin:  Negative for color change and rash.   Allergic/Immunologic: Negative for environmental allergies and food allergies.   Neurological:  Negative for dizziness, tremors, speech difficulty, weakness, numbness and headaches.   Hematological:  Does not bruise/bleed easily.   Psychiatric/Behavioral:  Negative for agitation, dysphoric mood and sleep disturbance. The patient is not nervous/anxious.         MEDICATIONS  Current Outpatient Medications   Medication Instructions    alclometasone (Aclovate) 0.05 % cream APPLY TO AFFECTED AREAS ON UNDERARMS TWICE DAILY AS NEEDED FOR RASH/ITCH    amitriptyline (Elavil) 10 mg tablet oral    amoxicillin-pot clavulanate (Augmentin) 875-125 mg tablet 2 tablets, oral, Every 12 hours    Arazlo 0.045 % lotion     aspirin-acetaminophen-caffeine (Excedrin Migraine) 250-250-65 mg tablet 1 tablet, oral, Every 6 hours PRN    azelastine (Astelin) 137 mcg (0.1 %) nasal spray     benzonatate (Tessalon) 100 mg capsule TAKE 1 CAPSULE 3 TIMES DAILY AS NEEDED FOR COUGH    berberine/herbal complex no.18 (BERBERINE-HERBAL COMB NO.18 ORAL) 1 tablet, oral, Daily    desloratadine (CLARINEX) 10 mg, oral, Daily    doxycycline (VIBRA-TABS) 100 mg, oral, 2 times daily    eletriptan (Relpax) 40 mg tablet Take 1 tab at migraine onset. May repeat once in 2 hours if needed.    fluticasone (Flonase) 50 mcg/actuation nasal spray INSERT 1 SPRAY INTO EACH  NOSTRIL ONCE DAILY    methocarbamol (Robaxin) 500 mg tablet 1 tab qhs    Nurtec ODT 75 mg tablet,disintegrating oral    ondansetron (ZOFRAN) 4 mg, oral, Every 6 hours PRN    pantoprazole (ProtoNix) 40 mg EC tablet 1 tablet, oral, Daily before breakfast    propranolol (Inderal) 60 mg tablet 1 tablet, oral, Daily    sertraline (ZOLOFT) 50 mg, oral, Daily RT    tirzepatide 12.5 mg/0.5 mL pen injector INJECT 0.2ML (20 UNITS) SUBCUTANEOUSLY ONCE A WEEK AND TITRATE DOSE AS DIRECTED BY PHYSICIAN    tirzepatide 12.5 mg/0.5 mL pen injector INJECT 0.2ML (20 UNITS) SUBCUTANEOUSLY ONCE A WEEK AND TITRATE DOSE AS DIRECTED BY PHYSICIAN    topiramate (Topamax) 100 mg tablet 1 tablet, oral, Nightly    topiramate (Topamax) 25 mg tablet 1 tab QHS x 1 week. Then increase by 1 tab QHS every week until goal of 100 mg QHS.    tretinoin (Retin-A) 0.025 % cream APPLY SPARINGLY TO AFFECTED AREA AT BEDTIME    Vivelle-Dot 0.0375 mg/24 hr transdermal        ALLERGIES  Allergies   Allergen Reactions    Macrolide Antibiotics GI Upset, Other and Nausea/vomiting     zithromax / severe vomiting    Azithromycin Other    Cephalexin GI Upset    Erythromycin Base Other        SOCIAL HISTORY    Family History   Problem Relation Name Age of Onset    Breast cancer Mother      Breast cancer Maternal Grandmother      Breast cancer Paternal Grandmother      Breast cancer Father's Sister      Breast cancer Mother's Sister      Breast cancer Mother's Sister           Social History     Tobacco Use    Smoking status: Never    Smokeless tobacco: Never   Substance Use Topics    Alcohol use: Not on file     Comment: 1 glass of wine every few months        VITALS  Vitals:    02/05/24 0836   BP: 112/76   Pulse: 80        PHYSICAL EXAM  Patient is alert and oriented x3, with appropriate mood. The gait is steady and hand grasps are equal. Sclera clear. The breasts are nearly symmetrical. The tissue is soft without palpable abnormalities, discrete nodules or masses.  The skin and nipples appear normal. There is no cervical, supraclavicular, or axillary lymphadenopathy palpable. Heart rate and rhythm normal, S1 and S2 appreciated. The lungs are clear bilaterally. Abdomen is soft & non-tender.    Physical Exam     IMAGING  BI mammo bilateral screening tomosynthesis 02/05/2024    Narrative  Interpreted By:  Ida Hoskins,  STUDY:  BI MAMMO BILATERAL SCREENING TOMOSYNTHESIS;  2/5/2024 8:45 am    FINDINGS:  2D and tomosynthesis images were reviewed at 1 mm slice thickness.    Density:  There are areas of scattered fibroglandular tissue.    No suspicious masses or calcifications are identified.    Impression  No mammographic evidence of malignancy.  BI-RADS Category:  1 Negative.  Recommendation:  Annual Screening.  Recommended Date:  1 Year.  Laterality:  Bilateral.      Time was spent viewing digital images of the radiology testing with the patient. I explained the results in depth, along with suggested explanation for follow up recommendations based on the testing results.          ORDERS  Orders Placed This Encounter   Procedures    BI mammo bilateral screening tomosynthesis     Standing Status:   Future     Standing Expiration Date:   3/31/2025     Order Specific Question:   Perform a breast ultrasound if clinically indicated by Radiologist?     Answer:   Yes     Order Specific Question:   Previous Mamm performed at  location?     Answer:   Yes     Order Specific Question:   Reason for exam:     Answer:   clinic screen     Order Specific Question:   Radiologist to Determine Optimal Study     Answer:   Yes     Order Specific Question:   Release result to [employer]     Answer:   Immediate     Order Specific Question:   Is this exam part of a Research Study? If Yes, link this order to the research study     Answer:   No     Order Specific Question:   Is the patient pregnant?     Answer:   No    MR breast bilateral w contrast full protocol     Standing Status:   Future     Standing  Expiration Date:   2/5/2025     Order Specific Question:   Has the Patient had a prior MRI with contrast and had an allergic reaction?     Answer:   No     Order Specific Question:   Previous Mamm performed at  location?     Answer:   Yes     Order Specific Question:   Reason for exam:     Answer:   high risk, lifetime risk 24.2%     Order Specific Question:   Is the patient pregnant or breast feeding?     Answer:   No     Order Specific Question:   What is the patient's sedation requirement?     Answer:   No Sedation     Order Specific Question:   Select MR Protocol for this Order:     Answer:   MR BREAST     Order Specific Question:   Does the patient have a Cochlear Implant, Pacemaker, Defibrillator, Pacing Wire, Brain Aneurysm Clip, Implanted Nerve or Bone Graft Simulator, Implanted Breast Tissue Expander, Glucose Monitor or Neulasta Device?     Answer:   No     Order Specific Question:   Radiologist to Determine Optimal Study     Answer:   Yes     Order Specific Question:   Release result to [employer]     Answer:   Immediate [1]     Order Specific Question:   Is this exam part of a Research Study? If Yes, link this order to the research study     Answer:   No           ASSESSMENT/PLAN  1. Family history of breast cancer  Clinic Appointment Request    MR breast bilateral w contrast full protocol      2. Breast cancer screening, high risk patient  Clinic Appointment Request    MR breast bilateral w contrast full protocol      3. Healthcare maintenance  BI mammo bilateral screening tomosynthesis           Follow up in about 1 year (around 2/5/2025), or with clinic screening mammogram.  HIGH RISK PLAN  Yearly mammogram with digital breast tomosynthesis  Twice yearly clinical breast examinations  Breast MRI (to schedule call [phone]) August 2024  Monthly self breast examinations &/or regular self breast awareness  Vitamin D3 2000 IU/daily (over the counter) unless your PCP recommends you take a specific  dose  Exercise 3-4 times per week for 45-60 minutes  Limit alcohol to 3-4 drinks per week if you are menopausal  Eat healthy low-fat diet with lots of vegetable & fruits  Risk models indicate personal risk of breast cancer in the next 5 years and lifetime (age 85-90):  Breast Cancer Risk Assessment Tool (Sapna): 5-year risk 2.6% (average 1.2%), lifetime risk 22.2% (average 11.3%).   Monika: 5-year risk 4.8% (average 1.2%), lifetime risk 24.2%, (average 9.6%)    She is eligible for endocrine therapy with Tamoxifen, and also Raloxifene or Aromatase inhibitor if/when menopausal. Endocrine therapy reduces lifetime risk of breast cancer by 50% when taken for 5 years. There is an alternative low dose Tamoxifen which can be taken for only 3 years. She plans to decrease dose of estrogen when she sees Wally in July. Endocrine therapy cannot be taken while on HRT.      Ashley Koehler, [facility]

## 2024-02-02 ENCOUNTER — OFFICE VISIT (OUTPATIENT)
Dept: DERMATOLOGY | Facility: CLINIC | Age: 49
End: 2024-02-02
Payer: COMMERCIAL

## 2024-02-02 DIAGNOSIS — L73.8 SEBACEOUS HYPERPLASIA OF FACE: ICD-10-CM

## 2024-02-02 PROCEDURE — 99212 OFFICE O/P EST SF 10 MIN: CPT | Performed by: SPECIALIST

## 2024-02-02 NOTE — PROGRESS NOTES
Subjective   HPI: Shyann Huerta is a 48 y.o. female is here for evaluation and treatment of inflamed sebaceous hyperplasia.    ROS: No other skin or systemic complaints other than what is documented elsewhere in the note.    ALLERGIES: Macrolide antibiotics, Azithromycin, Cephalexin, and Erythromycin base    SOCIAL:  reports that she has never smoked. She has never used smokeless tobacco. No history on file for alcohol use and drug use.    Objective     Description: Patient's numerous pearlescent papules have resolved with therapy.  No complaints are offered regarding medication.    Assessment/Plan   1. Sebaceous hyperplasia of face  Head - Anterior (Face)       Plan: Continue isotretinoin 40 mg daily.    FOLLOW UP:4 weeks.    The patient was encouraged to contact me with any further questions or concerns.  Micheal Rios MD  2/2/2024

## 2024-02-05 ENCOUNTER — HOSPITAL ENCOUNTER (OUTPATIENT)
Dept: RADIOLOGY | Facility: CLINIC | Age: 49
Discharge: HOME | End: 2024-02-05
Payer: COMMERCIAL

## 2024-02-05 ENCOUNTER — LAB (OUTPATIENT)
Dept: LAB | Facility: LAB | Age: 49
End: 2024-02-05
Payer: COMMERCIAL

## 2024-02-05 ENCOUNTER — OFFICE VISIT (OUTPATIENT)
Dept: SURGICAL ONCOLOGY | Facility: CLINIC | Age: 49
End: 2024-02-05
Payer: COMMERCIAL

## 2024-02-05 VITALS
WEIGHT: 138.4 LBS | SYSTOLIC BLOOD PRESSURE: 112 MMHG | DIASTOLIC BLOOD PRESSURE: 76 MMHG | HEART RATE: 80 BPM | BODY MASS INDEX: 25.31 KG/M2

## 2024-02-05 VITALS — BODY MASS INDEX: 25.4 KG/M2 | WEIGHT: 138.01 LBS | HEIGHT: 62 IN

## 2024-02-05 DIAGNOSIS — Z80.3 FAMILY HISTORY OF BREAST CANCER: Primary | ICD-10-CM

## 2024-02-05 DIAGNOSIS — Z12.39 ENCOUNTER FOR OTHER SCREENING FOR MALIGNANT NEOPLASM OF BREAST: ICD-10-CM

## 2024-02-05 DIAGNOSIS — L70.0 ACNE VULGARIS: ICD-10-CM

## 2024-02-05 DIAGNOSIS — Z12.39 BREAST CANCER SCREENING, HIGH RISK PATIENT: ICD-10-CM

## 2024-02-05 DIAGNOSIS — L73.8 SEBACEOUS HYPERPLASIA OF FACE: Primary | ICD-10-CM

## 2024-02-05 DIAGNOSIS — Z00.00 HEALTHCARE MAINTENANCE: ICD-10-CM

## 2024-02-05 LAB
ALBUMIN SERPL BCP-MCNC: 4.4 G/DL (ref 3.4–5)
ALP SERPL-CCNC: 74 U/L (ref 33–110)
ALT SERPL W P-5'-P-CCNC: 12 U/L (ref 7–45)
ANION GAP SERPL CALC-SCNC: 15 MMOL/L (ref 10–20)
AST SERPL W P-5'-P-CCNC: 20 U/L (ref 9–39)
B-HCG SERPL-ACNC: 5 MIU/ML
BASOPHILS # BLD AUTO: 0.02 X10*3/UL (ref 0–0.1)
BASOPHILS NFR BLD AUTO: 0.4 %
BILIRUB SERPL-MCNC: 0.5 MG/DL (ref 0–1.2)
BUN SERPL-MCNC: 16 MG/DL (ref 6–23)
CALCIUM SERPL-MCNC: 9.4 MG/DL (ref 8.6–10.3)
CHLORIDE SERPL-SCNC: 103 MMOL/L (ref 98–107)
CO2 SERPL-SCNC: 24 MMOL/L (ref 21–32)
CREAT SERPL-MCNC: 0.7 MG/DL (ref 0.5–1.05)
EGFRCR SERPLBLD CKD-EPI 2021: >90 ML/MIN/1.73M*2
EOSINOPHIL # BLD AUTO: 0.08 X10*3/UL (ref 0–0.7)
EOSINOPHIL NFR BLD AUTO: 1.5 %
ERYTHROCYTE [DISTWIDTH] IN BLOOD BY AUTOMATED COUNT: 13.3 % (ref 11.5–14.5)
GLUCOSE SERPL-MCNC: 86 MG/DL (ref 74–99)
HCT VFR BLD AUTO: 42.4 % (ref 36–46)
HGB BLD-MCNC: 13.5 G/DL (ref 12–16)
IMM GRANULOCYTES # BLD AUTO: 0.01 X10*3/UL (ref 0–0.7)
IMM GRANULOCYTES NFR BLD AUTO: 0.2 % (ref 0–0.9)
LYMPHOCYTES # BLD AUTO: 2.37 X10*3/UL (ref 1.2–4.8)
LYMPHOCYTES NFR BLD AUTO: 44.5 %
MCH RBC QN AUTO: 28.8 PG (ref 26–34)
MCHC RBC AUTO-ENTMCNC: 31.8 G/DL (ref 32–36)
MCV RBC AUTO: 91 FL (ref 80–100)
MONOCYTES # BLD AUTO: 0.28 X10*3/UL (ref 0.1–1)
MONOCYTES NFR BLD AUTO: 5.3 %
NEUTROPHILS # BLD AUTO: 2.57 X10*3/UL (ref 1.2–7.7)
NEUTROPHILS NFR BLD AUTO: 48.1 %
NRBC BLD-RTO: 0 /100 WBCS (ref 0–0)
PLATELET # BLD AUTO: 514 X10*3/UL (ref 150–450)
POTASSIUM SERPL-SCNC: 4 MMOL/L (ref 3.5–5.3)
PROT SERPL-MCNC: 7.4 G/DL (ref 6.4–8.2)
RBC # BLD AUTO: 4.68 X10*6/UL (ref 4–5.2)
SODIUM SERPL-SCNC: 138 MMOL/L (ref 136–145)
WBC # BLD AUTO: 5.3 X10*3/UL (ref 4.4–11.3)

## 2024-02-05 PROCEDURE — 85025 COMPLETE CBC W/AUTO DIFF WBC: CPT

## 2024-02-05 PROCEDURE — 99214 OFFICE O/P EST MOD 30 MIN: CPT | Performed by: NURSE PRACTITIONER

## 2024-02-05 PROCEDURE — 77067 SCR MAMMO BI INCL CAD: CPT | Mod: BILATERAL PROCEDURE | Performed by: RADIOLOGY

## 2024-02-05 PROCEDURE — 84702 CHORIONIC GONADOTROPIN TEST: CPT

## 2024-02-05 PROCEDURE — 80053 COMPREHEN METABOLIC PANEL: CPT

## 2024-02-05 PROCEDURE — 77063 BREAST TOMOSYNTHESIS BI: CPT | Mod: BILATERAL PROCEDURE | Performed by: RADIOLOGY

## 2024-02-05 PROCEDURE — 77067 SCR MAMMO BI INCL CAD: CPT

## 2024-02-05 PROCEDURE — 36415 COLL VENOUS BLD VENIPUNCTURE: CPT

## 2024-02-05 RX ORDER — ISOTRETINOIN 40 MG/1
40 CAPSULE ORAL
Qty: 30 CAPSULE | Refills: 0 | Status: SHIPPED | OUTPATIENT
Start: 2024-02-05 | End: 2024-03-01 | Stop reason: SDUPTHER

## 2024-02-05 ASSESSMENT — PAIN SCALES - GENERAL: PAINLEVEL: 0-NO PAIN

## 2024-02-09 DIAGNOSIS — F32.A DEPRESSION, UNSPECIFIED: ICD-10-CM

## 2024-02-09 DIAGNOSIS — F41.9 ANXIETY DISORDER, UNSPECIFIED: ICD-10-CM

## 2024-02-09 RX ORDER — SERTRALINE HYDROCHLORIDE 25 MG/1
25 TABLET, FILM COATED ORAL DAILY
Qty: 90 TABLET | Refills: 3 | Status: SHIPPED | OUTPATIENT
Start: 2024-02-09

## 2024-02-26 ENCOUNTER — TRANSCRIBE ORDERS (OUTPATIENT)
Dept: ORTHOPEDIC SURGERY | Facility: HOSPITAL | Age: 49
End: 2024-02-26
Payer: COMMERCIAL

## 2024-02-26 DIAGNOSIS — M54.50 LOW BACK PAIN, UNSPECIFIED BACK PAIN LATERALITY, UNSPECIFIED CHRONICITY, UNSPECIFIED WHETHER SCIATICA PRESENT: ICD-10-CM

## 2024-02-27 ENCOUNTER — APPOINTMENT (OUTPATIENT)
Dept: RADIOLOGY | Facility: CLINIC | Age: 49
End: 2024-02-27
Payer: COMMERCIAL

## 2024-02-27 ENCOUNTER — APPOINTMENT (OUTPATIENT)
Dept: ORTHOPEDIC SURGERY | Facility: CLINIC | Age: 49
End: 2024-02-27
Payer: COMMERCIAL

## 2024-03-01 ENCOUNTER — LAB (OUTPATIENT)
Dept: LAB | Facility: LAB | Age: 49
End: 2024-03-01
Payer: COMMERCIAL

## 2024-03-01 ENCOUNTER — OFFICE VISIT (OUTPATIENT)
Dept: DERMATOLOGY | Facility: CLINIC | Age: 49
End: 2024-03-01
Payer: COMMERCIAL

## 2024-03-01 VITALS — BODY MASS INDEX: 25.23 KG/M2 | WEIGHT: 138 LBS

## 2024-03-01 DIAGNOSIS — L70.0 ACNE VULGARIS: ICD-10-CM

## 2024-03-01 DIAGNOSIS — L73.8 SEBACEOUS HYPERPLASIA OF FACE: ICD-10-CM

## 2024-03-01 LAB
ALBUMIN SERPL BCP-MCNC: 4 G/DL (ref 3.4–5)
ALP SERPL-CCNC: 67 U/L (ref 33–110)
ALT SERPL W P-5'-P-CCNC: 12 U/L (ref 7–45)
ANION GAP SERPL CALC-SCNC: 13 MMOL/L (ref 10–20)
AST SERPL W P-5'-P-CCNC: 19 U/L (ref 9–39)
B-HCG SERPL-ACNC: 5 MIU/ML
BASOPHILS # BLD AUTO: 0.04 X10*3/UL (ref 0–0.1)
BASOPHILS NFR BLD AUTO: 0.8 %
BILIRUB SERPL-MCNC: 0.3 MG/DL (ref 0–1.2)
BUN SERPL-MCNC: 13 MG/DL (ref 6–23)
CALCIUM SERPL-MCNC: 9.5 MG/DL (ref 8.6–10.6)
CHLORIDE SERPL-SCNC: 105 MMOL/L (ref 98–107)
CO2 SERPL-SCNC: 25 MMOL/L (ref 21–32)
CREAT SERPL-MCNC: 0.64 MG/DL (ref 0.5–1.05)
EGFRCR SERPLBLD CKD-EPI 2021: >90 ML/MIN/1.73M*2
EOSINOPHIL # BLD AUTO: 0.09 X10*3/UL (ref 0–0.7)
EOSINOPHIL NFR BLD AUTO: 1.8 %
ERYTHROCYTE [DISTWIDTH] IN BLOOD BY AUTOMATED COUNT: 14.6 % (ref 11.5–14.5)
GLUCOSE SERPL-MCNC: 80 MG/DL (ref 74–99)
HCT VFR BLD AUTO: 37.2 % (ref 36–46)
HGB BLD-MCNC: 11.8 G/DL (ref 12–16)
IMM GRANULOCYTES # BLD AUTO: 0 X10*3/UL (ref 0–0.7)
IMM GRANULOCYTES NFR BLD AUTO: 0 % (ref 0–0.9)
LYMPHOCYTES # BLD AUTO: 1.82 X10*3/UL (ref 1.2–4.8)
LYMPHOCYTES NFR BLD AUTO: 35.4 %
MCH RBC QN AUTO: 29.3 PG (ref 26–34)
MCHC RBC AUTO-ENTMCNC: 31.7 G/DL (ref 32–36)
MCV RBC AUTO: 92 FL (ref 80–100)
MONOCYTES # BLD AUTO: 0.38 X10*3/UL (ref 0.1–1)
MONOCYTES NFR BLD AUTO: 7.4 %
NEUTROPHILS # BLD AUTO: 2.81 X10*3/UL (ref 1.2–7.7)
NEUTROPHILS NFR BLD AUTO: 54.6 %
NRBC BLD-RTO: 0 /100 WBCS (ref 0–0)
PLATELET # BLD AUTO: 443 X10*3/UL (ref 150–450)
POTASSIUM SERPL-SCNC: 5.1 MMOL/L (ref 3.5–5.3)
PROT SERPL-MCNC: 6.9 G/DL (ref 6.4–8.2)
RBC # BLD AUTO: 4.03 X10*6/UL (ref 4–5.2)
SODIUM SERPL-SCNC: 138 MMOL/L (ref 136–145)
WBC # BLD AUTO: 5.1 X10*3/UL (ref 4.4–11.3)

## 2024-03-01 PROCEDURE — 36415 COLL VENOUS BLD VENIPUNCTURE: CPT

## 2024-03-01 PROCEDURE — 85025 COMPLETE CBC W/AUTO DIFF WBC: CPT

## 2024-03-01 PROCEDURE — 99213 OFFICE O/P EST LOW 20 MIN: CPT

## 2024-03-01 PROCEDURE — 80053 COMPREHEN METABOLIC PANEL: CPT

## 2024-03-01 PROCEDURE — 84702 CHORIONIC GONADOTROPIN TEST: CPT

## 2024-03-01 RX ORDER — ISOTRETINOIN 40 MG/1
40 CAPSULE ORAL
Qty: 30 CAPSULE | Refills: 0 | Status: SHIPPED | OUTPATIENT
Start: 2024-03-01 | End: 2024-03-31

## 2024-03-01 NOTE — PROGRESS NOTES
Subjective   HPI: Shyann Huerta is a 48 y.o. female is here for evaluation and treatment of irritated sebaceous hyperplasia of face being tx with isotretinoin 40 mg daily.   -happy with results  -no ha, si, depression, anxiety, joint or muscle pain  -no concerns   -would like to continue therapy    ROS: No other skin or systemic complaints other than what is documented elsewhere in the note.    ALLERGIES: Macrolide antibiotics, Azithromycin, Cephalexin, and Erythromycin base    SOCIAL:  reports that she has never smoked. She has never used smokeless tobacco. No history on file for alcohol use and drug use.    Objective   Head - Anterior (Face)  Retinization of bilateral malar cheeks present. Slight xerosis around the mouth. Patient's numerous pearlescent papules have resolved with therapy         Assessment/Plan   1. Sebaceous hyperplasia of face  Head - Anterior (Face)    Continue:  Accutane 40 mg      Patient is to take the medication with a fatty food/meal to aid in absorption of the medication (if not taking branded Absorica).    Given recalcitrant nature of sebaceous hyperplasia, the patient wishes to continue on course of oral isotretinoin.     Previous labs were reviewed my myself personally. Discussed with patient to get labs drawn 2 days prior to our next appointment.    Potential side effects were reviewed with the patient today including teratogenicity and birth defects as well as the need for 2 forms of contraception, lipid abnormalities (hyperTGL which may result in pancreatitis), liver or kidney failure, risk of depression or suicide, risk of potential inflammatory bowel disease.    Common and expected side effects include xerosis (dryness) of the lips and skin, nose bleeds due to dryness of the nasal mucosa, and redness/rash of the skin (retinoid dermatitis). Recommended liberal emollients (Vaseline/Aquaphor/Cerave ointment to the lips, Aveeno Eczema Therapy to the body) to be used daily. If severe  xerosis oral vitamin E.    Discussed with the patient that they should not get pregnant while on therapy, do not donate blood, do not get body hair waxed, do not get any body piercing's, and do not give their medication to anyone. The patient verbalizes understanding.    Monthly questions must be answered by the patient in the iPledge system prior to being able to  their prescription. Signed iPledge consent form is on file.    Birth control:  Total hysterectomy       Current weeks of tx - 8   Weeks of tx at next appointment - 12    Weight: 138 lbs      Related Medications  ISOtretinoin (Accutane) 40 mg capsule  Take 1 capsule (40 mg) by mouth once daily with breakfast.         FOLLOW UP: 4 weeks - please do labs 2 days prior    The patient was encouraged to contact me with any further questions or concerns.  Xin Fuentes PA-C  3/7/2024

## 2024-03-05 ENCOUNTER — HOSPITAL ENCOUNTER (OUTPATIENT)
Dept: RADIOLOGY | Facility: CLINIC | Age: 49
Discharge: HOME | End: 2024-03-05
Payer: COMMERCIAL

## 2024-03-05 ENCOUNTER — OFFICE VISIT (OUTPATIENT)
Dept: ORTHOPEDIC SURGERY | Facility: CLINIC | Age: 49
End: 2024-03-05
Payer: COMMERCIAL

## 2024-03-05 VITALS — HEIGHT: 62 IN | BODY MASS INDEX: 23 KG/M2 | WEIGHT: 125 LBS

## 2024-03-05 DIAGNOSIS — M54.50 LOW BACK PAIN, UNSPECIFIED BACK PAIN LATERALITY, UNSPECIFIED CHRONICITY, UNSPECIFIED WHETHER SCIATICA PRESENT: ICD-10-CM

## 2024-03-05 DIAGNOSIS — M51.36 DDD (DEGENERATIVE DISC DISEASE), LUMBAR: Primary | ICD-10-CM

## 2024-03-05 PROCEDURE — 99213 OFFICE O/P EST LOW 20 MIN: CPT | Performed by: ORTHOPAEDIC SURGERY

## 2024-03-05 PROCEDURE — 72110 X-RAY EXAM L-2 SPINE 4/>VWS: CPT

## 2024-03-05 PROCEDURE — 72110 X-RAY EXAM L-2 SPINE 4/>VWS: CPT | Performed by: RADIOLOGY

## 2024-03-05 PROCEDURE — 99203 OFFICE O/P NEW LOW 30 MIN: CPT | Performed by: ORTHOPAEDIC SURGERY

## 2024-03-05 ASSESSMENT — PAIN - FUNCTIONAL ASSESSMENT: PAIN_FUNCTIONAL_ASSESSMENT: 0-10

## 2024-03-06 NOTE — PROGRESS NOTES
HPI:Shyann Huerta is a pleasant 48-year-old woman, comes in with a 12-month history of back pain.  It is rather constant.  It bothers her even at nighttime.  It has not improved.  She has had an elevated CRP which according to the patient, was upwards of 11.  Her symptoms have persisted despite physical therapy exercises and regular anti-inflammatories.      ROS:  Reviewed on EMR and patient intake sheet.    PMH/SH:  Reviewed on EMR and patient intake sheet.    Exam:  Physical Exam    Constitutional: Well appearing; no acute distress  Eyes: pupils are equal and round  Psych: normal affect  Respiratory: non-labored breathing  Cardiovascular: regular rate and rhythm  GI: non-distended abdomen  Musculoskeletal: no pain with range of motion of the hips bilaterally  Neurologic: [5]/5 strength in the lower extremities bilaterally]; [negative] straight leg raise    Radiology:     X-rays demonstrate moderate disc degeneration L5-S1 and spondylosis at L4-5 and L5-S1    Diagnosis:    Chronic low back pain; lumbar spondylosis; elevated inflammatory markers    Assessment and Plan:   48-year-old woman with unremitting chronic back pain for the last 12 months which has persisted despite physical therapy exercises and anti-inflammatories.  She also has an elevated C-reactive protein.  As result, she will need an MRI to rule out underlying infection or other process.  I will see her back to go over the MRI discuss further treatment options.    The patient was in agreement with the plan. At the end of the visit today, the patient felt that all questions had been answered satisfactorily.  The patient was pleased with the visit and very appreciative for the care rendered.     Thank you very much for the kind referral.  It is a privilege, and a pleasure, to partner with you in the care of your patients.  I would be delighted to assist you with any further consultations as needed.          Gianfranco Braswell MD    Chief of Spine  Surgery, [facility]  Director of Spine Service, [facility]  , Department of Orthopaedics  [facility] [employer]  [address] [place] [address]  P: [phone]  [email or web address]    This note was dictated with voice recognition software.  It has not been proofread for grammatical errors, typographical mistakes or other semantic inconsistencies.

## 2024-03-20 ENCOUNTER — HOSPITAL ENCOUNTER (OUTPATIENT)
Dept: RADIOLOGY | Facility: HOSPITAL | Age: 49
Discharge: HOME | End: 2024-03-20
Payer: COMMERCIAL

## 2024-03-20 DIAGNOSIS — M54.50 LOW BACK PAIN, UNSPECIFIED BACK PAIN LATERALITY, UNSPECIFIED CHRONICITY, UNSPECIFIED WHETHER SCIATICA PRESENT: ICD-10-CM

## 2024-03-20 PROCEDURE — 72148 MRI LUMBAR SPINE W/O DYE: CPT

## 2024-03-20 PROCEDURE — 72148 MRI LUMBAR SPINE W/O DYE: CPT | Performed by: RADIOLOGY

## 2024-04-01 ENCOUNTER — LAB (OUTPATIENT)
Dept: LAB | Facility: LAB | Age: 49
End: 2024-04-01
Payer: COMMERCIAL

## 2024-04-01 ENCOUNTER — OFFICE VISIT (OUTPATIENT)
Dept: DERMATOLOGY | Facility: CLINIC | Age: 49
End: 2024-04-01
Payer: COMMERCIAL

## 2024-04-01 DIAGNOSIS — L70.0 ACNE VULGARIS: ICD-10-CM

## 2024-04-01 DIAGNOSIS — L73.8 SEBACEOUS HYPERPLASIA OF FACE: Primary | ICD-10-CM

## 2024-04-01 LAB
ALBUMIN SERPL BCP-MCNC: 3.9 G/DL (ref 3.4–5)
ALP SERPL-CCNC: 64 U/L (ref 33–110)
ALT SERPL W P-5'-P-CCNC: 8 U/L (ref 7–45)
ANION GAP SERPL CALC-SCNC: 12 MMOL/L (ref 10–20)
AST SERPL W P-5'-P-CCNC: 11 U/L (ref 9–39)
B-HCG SERPL-ACNC: 5 MIU/ML
BASOPHILS # BLD AUTO: 0.04 X10*3/UL (ref 0–0.1)
BASOPHILS NFR BLD AUTO: 0.7 %
BILIRUB SERPL-MCNC: 0.4 MG/DL (ref 0–1.2)
BUN SERPL-MCNC: 14 MG/DL (ref 6–23)
CALCIUM SERPL-MCNC: 9.2 MG/DL (ref 8.6–10.6)
CHLORIDE SERPL-SCNC: 105 MMOL/L (ref 98–107)
CO2 SERPL-SCNC: 27 MMOL/L (ref 21–32)
CREAT SERPL-MCNC: 0.57 MG/DL (ref 0.5–1.05)
EGFRCR SERPLBLD CKD-EPI 2021: >90 ML/MIN/1.73M*2
EOSINOPHIL # BLD AUTO: 0.1 X10*3/UL (ref 0–0.7)
EOSINOPHIL NFR BLD AUTO: 1.8 %
ERYTHROCYTE [DISTWIDTH] IN BLOOD BY AUTOMATED COUNT: 14.6 % (ref 11.5–14.5)
GLUCOSE SERPL-MCNC: 82 MG/DL (ref 74–99)
HCT VFR BLD AUTO: 34.9 % (ref 36–46)
HGB BLD-MCNC: 11.1 G/DL (ref 12–16)
IMM GRANULOCYTES # BLD AUTO: 0.01 X10*3/UL (ref 0–0.7)
IMM GRANULOCYTES NFR BLD AUTO: 0.2 % (ref 0–0.9)
LYMPHOCYTES # BLD AUTO: 2.02 X10*3/UL (ref 1.2–4.8)
LYMPHOCYTES NFR BLD AUTO: 36.6 %
MCH RBC QN AUTO: 29 PG (ref 26–34)
MCHC RBC AUTO-ENTMCNC: 31.8 G/DL (ref 32–36)
MCV RBC AUTO: 91 FL (ref 80–100)
MONOCYTES # BLD AUTO: 0.34 X10*3/UL (ref 0.1–1)
MONOCYTES NFR BLD AUTO: 6.2 %
NEUTROPHILS # BLD AUTO: 3.01 X10*3/UL (ref 1.2–7.7)
NEUTROPHILS NFR BLD AUTO: 54.5 %
NRBC BLD-RTO: 0 /100 WBCS (ref 0–0)
PLATELET # BLD AUTO: 407 X10*3/UL (ref 150–450)
POTASSIUM SERPL-SCNC: 4.5 MMOL/L (ref 3.5–5.3)
PROT SERPL-MCNC: 6.4 G/DL (ref 6.4–8.2)
RBC # BLD AUTO: 3.83 X10*6/UL (ref 4–5.2)
SODIUM SERPL-SCNC: 139 MMOL/L (ref 136–145)
WBC # BLD AUTO: 5.5 X10*3/UL (ref 4.4–11.3)

## 2024-04-01 PROCEDURE — 80053 COMPREHEN METABOLIC PANEL: CPT

## 2024-04-01 PROCEDURE — 36415 COLL VENOUS BLD VENIPUNCTURE: CPT

## 2024-04-01 PROCEDURE — 84702 CHORIONIC GONADOTROPIN TEST: CPT

## 2024-04-01 PROCEDURE — 85025 COMPLETE CBC W/AUTO DIFF WBC: CPT

## 2024-04-01 PROCEDURE — 99213 OFFICE O/P EST LOW 20 MIN: CPT | Performed by: SPECIALIST

## 2024-04-01 RX ORDER — ISOTRETINOIN 40 MG/1
40 CAPSULE ORAL
Qty: 30 CAPSULE | Refills: 0 | Status: SHIPPED | OUTPATIENT
Start: 2024-04-01 | End: 2024-04-02 | Stop reason: SDUPTHER

## 2024-04-01 NOTE — PROGRESS NOTES
Subjective     Shyann Huerta is a 48 y.o. female who presents for the following: Seb Hyperplasia (On Claravis 40 mg - Main. Dose ).     Review of Systems:  No other skin or systemic complaints other than what is documented elsewhere in the note.    The following portions of the chart were reviewed this encounter and updated as appropriate:            Specialty Problems          Dermatology Problems    Pityriasis rosea    Melanocytic nevi of left upper limb, including shoulder    Melanocytic nevi of right lower limb, including hip    Melanocytic nevi of trunk    Milia    Sebaceous gland hyperplasia        Objective   Well appearing patient in no apparent distress; mood and affect are within normal limits.    A focused skin examination was performed. All findings within normal limits unless otherwise noted below.        Assessment/Plan: Patient continues to clear nicely.  Patient has not been taking isotretinoin for a week or so because she was on vacation.  I discussed continue isotretinoin at 40 mg daily for 1 week then starting Monday Wednesday Friday therapy only.  Patient was agreeable with this approach.  No complaints are offered regarding isotretinoin.  I did read discussed the use of SPF 60 or above broad-spectrum water resistant sunscreen daily.  Patient should return in 4 weeks.

## 2024-04-02 DIAGNOSIS — L73.8 SEBACEOUS HYPERPLASIA OF FACE: ICD-10-CM

## 2024-04-02 RX ORDER — ISOTRETINOIN 40 MG/1
40 CAPSULE ORAL
Qty: 30 CAPSULE | Refills: 0 | Status: SHIPPED | OUTPATIENT
Start: 2024-04-02

## 2024-05-20 ENCOUNTER — APPOINTMENT (OUTPATIENT)
Dept: DERMATOLOGY | Facility: CLINIC | Age: 49
End: 2024-05-20
Payer: COMMERCIAL

## 2024-06-11 DIAGNOSIS — G43.009 MIGRAINE WITHOUT AURA AND WITHOUT STATUS MIGRAINOSUS, NOT INTRACTABLE: ICD-10-CM

## 2024-06-13 ENCOUNTER — APPOINTMENT (OUTPATIENT)
Dept: DERMATOLOGY | Facility: CLINIC | Age: 49
End: 2024-06-13
Payer: COMMERCIAL

## 2024-07-02 ENCOUNTER — OFFICE VISIT (OUTPATIENT)
Dept: ORTHOPEDIC SURGERY | Facility: CLINIC | Age: 49
End: 2024-07-02
Payer: COMMERCIAL

## 2024-07-02 ENCOUNTER — HOSPITAL ENCOUNTER (OUTPATIENT)
Dept: RADIOLOGY | Facility: CLINIC | Age: 49
Discharge: HOME | End: 2024-07-02
Payer: COMMERCIAL

## 2024-07-02 VITALS — HEIGHT: 62 IN | WEIGHT: 125 LBS | BODY MASS INDEX: 23 KG/M2

## 2024-07-02 DIAGNOSIS — M79.622 LEFT UPPER ARM PAIN: ICD-10-CM

## 2024-07-02 DIAGNOSIS — M54.50 LOW BACK PAIN, UNSPECIFIED BACK PAIN LATERALITY, UNSPECIFIED CHRONICITY, UNSPECIFIED WHETHER SCIATICA PRESENT: ICD-10-CM

## 2024-07-02 PROCEDURE — 72110 X-RAY EXAM L-2 SPINE 4/>VWS: CPT

## 2024-07-02 PROCEDURE — 99214 OFFICE O/P EST MOD 30 MIN: CPT | Performed by: ORTHOPAEDIC SURGERY

## 2024-07-02 PROCEDURE — 72110 X-RAY EXAM L-2 SPINE 4/>VWS: CPT | Performed by: RADIOLOGY

## 2024-07-02 ASSESSMENT — PAIN - FUNCTIONAL ASSESSMENT: PAIN_FUNCTIONAL_ASSESSMENT: 0-10

## 2024-07-03 ENCOUNTER — APPOINTMENT (OUTPATIENT)
Dept: ORTHOPEDIC SURGERY | Facility: CLINIC | Age: 49
End: 2024-07-03
Payer: COMMERCIAL

## 2024-07-03 ENCOUNTER — APPOINTMENT (OUTPATIENT)
Dept: RADIOLOGY | Facility: CLINIC | Age: 49
End: 2024-07-03
Payer: COMMERCIAL

## 2024-07-03 DIAGNOSIS — M79.622 LEFT UPPER ARM PAIN: ICD-10-CM

## 2024-07-03 NOTE — PROGRESS NOTES
HPI:Shyann Huerta is a 49-year-old woman, who comes in today with complaints about 3-month history of some vague left upper extremity pain.  The pain most prominently in the shoulder and the upper arm.  It is worse with forward elevation and abduction of the arm.  She denies numbness and tingling and/or myelopathic symptoms.  The pain is rather constant but variable with range of motion.  Patient denies neck pain.  She has not had physical therapy or other treatment.      ROS:  Reviewed on EMR and patient intake sheet.    PMH/SH:   Reviewed on EMR and patient intake sheet.    Exam:  Physical Exam    Constitutional: Well appearing; no acute distress  Eyes: pupils are equal and round  Psych: normal affect  Respiratory: non-labored breathing  Cardiovascular: regular rate and rhythm  GI: non-distended abdomen  Musculoskeletal: Mild pain with abduction of the left shoulder as well as internal rotation and forward flexion  Neurologic: [4]/5 strength in the upper extremities bilaterally]; [negative] Pardo's; [no hyper-reflexia]    Radiology:  No new imaging today    Diagnosis:  Rotator cuff impingement    Assessment and Plan:   49-year-old woman, with left shoulder and upper arm pain which is most consistent with rotator cuff impingement.  I recommend some physical therapy.  If her symptoms persist, then follow-up with one of my shoulder colleagues would be appropriate.  This does not present as a cervical radiculopathy at this time.  I can see her back as needed.    The patient was in agreement with the plan. At the end of the visit today, the patient felt that all questions had been answered satisfactorily.  The patient was pleased with the visit and very appreciative for the care rendered.     Thank you very much for the kind referral.  It is a privilege, and a pleasure, to partner with you in the care of your patients.  I would be delighted to assist you with any further consultations as needed.      Gianfranco Nichols  MD Michaelle    Chief of Spine Surgery, [facility]  Director of Spine Service, [facility]  , Department of Orthopaedics  [facility] [employer]  [address] [place] [address]  P: [phone]  [facility]    This note was dictated with voice recognition software.  It has not been proofread for grammatical errors, typographical mistakes or other semantic inconsistencies.

## 2024-07-22 ENCOUNTER — APPOINTMENT (OUTPATIENT)
Dept: DERMATOLOGY | Facility: CLINIC | Age: 49
End: 2024-07-22
Payer: COMMERCIAL

## 2024-07-23 ENCOUNTER — APPOINTMENT (OUTPATIENT)
Dept: DERMATOLOGY | Facility: CLINIC | Age: 49
End: 2024-07-23
Payer: COMMERCIAL

## 2024-07-24 ENCOUNTER — HOSPITAL ENCOUNTER (OUTPATIENT)
Dept: RADIOLOGY | Facility: CLINIC | Age: 49
Discharge: HOME | End: 2024-07-24
Payer: COMMERCIAL

## 2024-07-24 ENCOUNTER — OFFICE VISIT (OUTPATIENT)
Dept: ORTHOPEDIC SURGERY | Facility: CLINIC | Age: 49
End: 2024-07-24
Payer: COMMERCIAL

## 2024-07-24 DIAGNOSIS — M25.519 SHOULDER PAIN, UNSPECIFIED CHRONICITY, UNSPECIFIED LATERALITY: Primary | ICD-10-CM

## 2024-07-24 DIAGNOSIS — M67.912 DISORDER OF LEFT ROTATOR CUFF: ICD-10-CM

## 2024-07-24 DIAGNOSIS — M79.622 LEFT UPPER ARM PAIN: ICD-10-CM

## 2024-07-24 DIAGNOSIS — M25.519 SHOULDER PAIN, UNSPECIFIED CHRONICITY, UNSPECIFIED LATERALITY: ICD-10-CM

## 2024-07-24 PROCEDURE — 73030 X-RAY EXAM OF SHOULDER: CPT | Mod: LT

## 2024-07-24 PROCEDURE — 73030 X-RAY EXAM OF SHOULDER: CPT | Mod: LEFT SIDE | Performed by: RADIOLOGY

## 2024-07-24 PROCEDURE — 99213 OFFICE O/P EST LOW 20 MIN: CPT | Performed by: STUDENT IN AN ORGANIZED HEALTH CARE EDUCATION/TRAINING PROGRAM

## 2024-07-24 NOTE — PROGRESS NOTES
CHIEF COMPLAINT: No chief complaint on file.    History: 49 y.o. female presents to the office today for evaluation of her left shoulder.  The patient states that she been having left shoulder pain for the last few months.  No specific injury she can remember.  She works for school district.  She otherwise generally healthy.  Has not had any specific treatments yet for the left shoulder.  Pain is primarily laterally based.  Denies numbness or tingling.  Does have pain at night.    Past medical history, past surgical history, medications, allergies, family history, social history, and review of systems were reviewed today.    A 12 point review of systems was negative other than as stated in the HPI.    Past Medical History:   Diagnosis Date    Allergic contact dermatitis due to plants, except food 06/10/2016    Poison ivy    Encounter for antibody response examination 01/25/2018    Immunity status testing    Other conditions influencing health status 01/05/2021    Normal colonoscopy    Personal history of other benign neoplasm     History of uterine leiomyoma    Personal history of other diseases of the digestive system 04/11/2022    History of gastritis    Personal history of other diseases of the nervous system and sense organs 01/05/2021    History of conjunctivitis    Personal history of other diseases of the respiratory system 02/09/2021    History of chronic sinusitis        Allergies   Allergen Reactions    Macrolide Antibiotics GI Upset, Other and Nausea/vomiting     zithromax / severe vomiting    Azithromycin Other    Cephalexin GI Upset    Erythromycin Base Other        Past Surgical History:   Procedure Laterality Date    BREAST BIOPSY  11/15/2017    Biopsy Breast Percutaneous Needle Core    HYSTERECTOMY  10/24/2014    Hysterectomy    OTHER SURGICAL HISTORY  02/09/2021    Nasal septoplasty    OTHER SURGICAL HISTORY  02/09/2021    Rhinoplasty    TOTAL ABDOMINAL HYSTERECTOMY W/ BILATERAL SALPINGOOPHORECTOMY   11/15/2017    Total Abdominal Hysterectomy With Removal Of Both Ovaries        Family History   Problem Relation Name Age of Onset    Breast cancer Mother      Breast cancer Maternal Grandmother      Breast cancer Paternal Grandmother      Breast cancer Father's Sister      Breast cancer Mother's Sister      Breast cancer Mother's Sister          Social History     Socioeconomic History    Marital status: Single     Spouse name: Not on file    Number of children: Not on file    Years of education: Not on file    Highest education level: Not on file   Occupational History    Not on file   Tobacco Use    Smoking status: Never    Smokeless tobacco: Never   Substance and Sexual Activity    Alcohol use: Not on file     Comment: 1 glass of wine every few months    Drug use: Not on file    Sexual activity: Not on file   Other Topics Concern    Not on file   Social History Narrative    Not on file     Social Determinants of Health     Financial Resource Strain: Not on file   Food Insecurity: Not on file   Transportation Needs: Not on file   Physical Activity: Not on file   Stress: Not on file   Social Connections: Not on file   Intimate Partner Violence: Not on file   Housing Stability: Not on file        CURRENT MEDICATIONS:   Current Outpatient Medications   Medication Sig Dispense Refill    alclometasone (Aclovate) 0.05 % cream APPLY TO AFFECTED AREAS ON UNDERARMS TWICE DAILY AS NEEDED FOR RASH/ITCH      amitriptyline (Elavil) 10 mg tablet Take by mouth.      amoxicillin-pot clavulanate (Augmentin) 875-125 mg tablet Take 2 tablets (1,750 mg) by mouth every 12 hours.      Arazlo 0.045 % lotion       aspirin-acetaminophen-caffeine (Excedrin Migraine) 250-250-65 mg tablet Take 1 tablet by mouth every 6 hours if needed for headaches.      azelastine (Astelin) 137 mcg (0.1 %) nasal spray       benzonatate (Tessalon) 100 mg capsule TAKE 1 CAPSULE 3 TIMES DAILY AS NEEDED FOR COUGH      berberine/herbal complex no.18  (BERBERINE-HERBAL COMB NO.18 ORAL) Take 1 tablet by mouth once daily.      desloratadine (Clarinex) 5 mg tablet Take 2 tablets (10 mg) by mouth once daily.      doxycycline (Vibra-Tabs) 100 mg tablet Take 1 tablet (100 mg) by mouth 2 times a day.      eletriptan (Relpax) 40 mg tablet Take 1 tab at migraine onset. May repeat once in 2 hours if needed.      fluticasone (Flonase) 50 mcg/actuation nasal spray INSERT 1 SPRAY INTO EACH NOSTRIL ONCE DAILY      ISOtretinoin (Claravis) 40 mg capsule Take 1 capsule (40 mg) by mouth once daily with breakfast. 30 capsule 0    methocarbamol (Robaxin) 500 mg tablet 1 tab qhs      ondansetron (Zofran) 4 mg tablet Take 1 tablet (4 mg) by mouth every 6 hours if needed.      pantoprazole (ProtoNix) 40 mg EC tablet Take 1 tablet (40 mg) by mouth once daily in the morning. Take before meals.      propranolol (Inderal) 60 mg tablet Take 1 tablet (60 mg) by mouth once daily.      rimegepant (Nurtec ODT) 75 mg tablet,disintegrating Take 1 tablet (75 mg) by mouth every other day. 45 tablet 2    sertraline (Zoloft) 25 mg tablet TAKE 1 TABLET BY MOUTH EVERY DAY 90 tablet 3    sertraline (Zoloft) 50 mg tablet Take 1 tablet (50 mg) by mouth once daily. 90 tablet 3    tirzepatide 12.5 mg/0.5 mL pen injector INJECT 0.2ML (20 UNITS) SUBCUTANEOUSLY ONCE A WEEK AND TITRATE DOSE AS DIRECTED BY PHYSICIAN      tirzepatide 12.5 mg/0.5 mL pen injector INJECT 0.2ML (20 UNITS) SUBCUTANEOUSLY ONCE A WEEK AND TITRATE DOSE AS DIRECTED BY PHYSICIAN      topiramate (Topamax) 100 mg tablet Take 1 tablet (100 mg) by mouth once daily at bedtime.      topiramate (Topamax) 25 mg tablet 1 tab QHS x 1 week. Then increase by 1 tab QHS every week until goal of 100 mg QHS.      tretinoin (Retin-A) 0.025 % cream APPLY SPARINGLY TO AFFECTED AREA AT BEDTIME      Vivelle-Dot 0.0375 mg/24 hr Place on the skin.       No current facility-administered medications for this visit.       Physical Examination:      8/4/2023      "8:29 AM 12/27/2023     9:42 AM 2/5/2024     8:17 AM 2/5/2024     8:36 AM 3/1/2024     8:33 AM 3/5/2024     3:19 PM 7/2/2024     2:42 PM   Vitals   Systolic 104 110  112      Diastolic 80 80  76      Heart Rate 84 70  80      Height (in)   1.575 m (5' 2.01\")   1.575 m (5' 2\") 1.575 m (5' 2\")   Weight (lb)  138 138.01 138.4 138 125 125   BMI  25.24 kg/m2 25.24 kg/m2 25.31 kg/m2 25.23 kg/m2 22.86 kg/m2 22.86 kg/m2   BSA (m2)  1.65 m2 1.65 m2 1.66 m2 1.65 m2 1.57 m2 1.57 m2   Visit Report Report Report Report Report Report Report Report      There is no height or weight on file to calculate BMI.    Well-appearing, appears stated age, pleasant and cooperative, appropriate mood and behavior. Height and weight reviewed. Alert and oriented x3.  Auditory function intact.  No acute distress.  Intact ocular function, BENJAMÍN, EOMI. Breathing is unlabored .  There is no evidence of jugular venous distension. Skin appearance is normal without evidence of rash or other lesions. 2+ radial pulses bilaterally, fingers pink and wwp, good capillary refill, no pitting edema. No appreciable lymphadenopathy in bilateral upper extremities. SILT throughout both upper extremities, median/radial/ulnar/musculocutaneous/axillary nerve motor and sensory intact (except for abnormalities noted in focused musculoskeletal exam section below).     Neck exam: Full range of motion of the neck in flexion/extension and rotational movements. No significant areas of tenderness to palpation in the neck.    On exam of bilateral upper extremities, she has preserved range of motion of both shoulders with active forward flexion 170, external rotation to 70, internal Tatian T12.  Preserved rotator cuff strength bilaterally.  Significant pain with Neer and Lanza maneuvers of the left shoulder.    Imaging: Radiographs of the left shoulder performed today.  Personally interpreted by myself.  Preserved glenohumeral joint space.  Preserved acromiohumeral interval.  " No acute fractures noted.       Assessment: Left rotator cuff tinnitus    Plan: Patient's symptoms, test result and exam are consistent with a diagnosis of rotator cuff tendinitis.  We did discuss the natural history of this.  Conservative management is often the first-line treatment for this, which includes physical therapy, injections and activity modification.  In rare cases of recalcitrant pain, surgery may be indicated, but this is only after extensive nonoperative care.  Patient understands this.  They wish to proceed with physical therapy.      Dragon software was used to dictate this note, please be aware that minor errors in transcription may be present.    Van Beck MD    Shoulder/Elbow Surgery  [facility]

## 2024-08-04 NOTE — PROGRESS NOTES
Subjective   Patient ID: Shyann Huerta is a 49 y.o. female who presents for CPE    HPI   The patient reports a history of intermittent episodes of pain-unable to describe-in the left shoulder and upper arm regions times a few months.  She reports that the episodes can occur at night in bed and also can be precipitated by reaching up.  She reports no radiation of discomfort.  She reports no other associated symptoms.  She does report that she thinks that the episodes developed after she had been doing a great deal of moving.  Over the past several weeks, she reports a decrease in the frequency and intensity of the episodes.    Since her last office visit, the patient reports continued chronic occasional episodes of discomfort in the central chest region.  She reports that the episodes can occur at rest, occasionally with activity and sometimes after oral intake.  She reports no radiation of discomfort and no other associated symptoms.  Since being placed on a daily dose of pantoprazole-20 mg daily in late June, she reports a decrease in the frequency and intensity of the episodes.    Since her last office visit, the patient reports continued chronic daily episodes of aching pain in the forehead region.  She again reports that the episodes can resolve 30 minutes after she takes Excedrin and can also resolve 30 minutes after taking Nurtec.  She still reports that the episodes can be precipitated with use of alcohol, intake of chocolate, or even not eating.  She reports no other associated symptoms.  Since beginning use of Qulipta 1 month ago, she has noted a decrease in the intensity but no change in the frequency of episodes.    The patient reports recent improvement in her energy level.    Over the past few months, she reports no episodes of aching pain in the right lower back region  Review of Systems  All systems have been reviewed and are normal except as previously noted  Objective   There were no vitals taken for  this visit.    Physical Exam  Head-palpation revealed no tenderness over the maxillary or frontal sinuses  Eyes-extraocular movements intact pupils equal and reactive to light; fundi revealed good retinal color no hemorrhages or exudates  Ears-palpation of pinnas and traguses revealed no tenderness. External auditory canals not erythematous or swollen. TMs clear.      Nose-turbinates not erythematous or swollen; nasal septal deviation noted to the right  Mouth-xerostomia noted.  Posterior pharynx is not erythematous or swollen. Tonsillar pillars appeared normal no exudates  Neck no lymphadenopathy. Thyroid gland not enlarged. , No bruits  Breasts--patient will be having MRI of the breast in the near future  Lungs-clear to auscultation bilaterally  Cardiac-tachycardic, rhythm regular no murmurs no JVD  Abdomen-soft nondistended normal active bowel sounds. Palpation revealed no tenderness or masses  Pulses 2+ bilaterally in the upper extremities, 0 bilaterally in the lower extremities  Extremities-no peripheral edema     Musculoskeletal  Left shoulder-no erythema or swelling.  Full range of motion with mild discomfort noted on flexion and external rotation.  Full range of motion with no pain noted in all other directions of motion.  Palpation revealed no tenderness or increase in warmth.  Positive Lanza sign, slightly positive Neer's sign, negative arm crossover test, negative impingement sign, negative empty can test, negative Yergason sign.    Neurologic  Mental status-alert and oriented x3   Cranial nerves-2 through 12 grossly intact, no visual field abnormalities  Motor-no pronator drift noted, strength-5/5 in all muscle groups tested, , no tremor noted.  No bradykinesia noted.  No rigidity noted.  Negative pull test  Sensory-Light touch sensation fully intact  Pinprick sensation fully intact  Vibratory sensation fully intact  Cerebellar-no truncal ataxia, good coordination finger-nose testing,, good  coordination heel-to-shin testing, normal rapid alternating movements  Romberg negative, no abnormality in tandem gait  Reflexes-ankle jerks 2+/4 bilaterally; all other reflexes tested 3+/4  Assessment/Plan   Problem List Items Addressed This Visit             ICD-10-CM    Migraine without aura G43.009    Relevant Medications    rimegepant (Nurtec ODT) 75 mg tablet,disintegrating    Gastroesophageal reflux disease without esophagitis K21.9    Relevant Orders    CBC and Auto Differential    Comprehensive Metabolic Panel    C-Reactive Protein, High Sensitivity    Lipid Panel    Thyroid Stimulating Hormone    Vitamin B12    Vitamin D 25-Hydroxy,Total (for eval of Vitamin D levels)    Anxiety F41.9    Relevant Orders    CBC and Auto Differential    Comprehensive Metabolic Panel    C-Reactive Protein, High Sensitivity    Lipid Panel    Thyroid Stimulating Hormone    Vitamin B12    Vitamin D 25-Hydroxy,Total (for eval of Vitamin D levels)    Allergic rhinitis J30.9    Relevant Orders    CBC and Auto Differential    Comprehensive Metabolic Panel    C-Reactive Protein, High Sensitivity    Lipid Panel    Thyroid Stimulating Hormone    Vitamin B12    Vitamin D 25-Hydroxy,Total (for eval of Vitamin D levels)    LPRD (laryngopharyngeal reflux disease) K21.9    Relevant Orders    CBC and Auto Differential    Comprehensive Metabolic Panel    C-Reactive Protein, High Sensitivity    Lipid Panel    Thyroid Stimulating Hormone    Vitamin B12    Vitamin D 25-Hydroxy,Total (for eval of Vitamin D levels)    Chronic migraine without aura, intractable, without status migrainosus G43.719    Relevant Orders    CBC and Auto Differential    Comprehensive Metabolic Panel    C-Reactive Protein, High Sensitivity    Lipid Panel    Thyroid Stimulating Hormone    Vitamin B12    Vitamin D 25-Hydroxy,Total (for eval of Vitamin D levels)     Other Visit Diagnoses         Codes    Routine general medical examination at a health care facility    -   Primary Z00.00    Relevant Orders    POCT UA (nonautomated) manually resulted    ECG 12 lead (Clinic Performed)    Sebaceous hyperplasia of face     L73.8    Relevant Medications    ISOtretinoin (Claravis) 40 mg capsule    Encounter for immunization     Z23    Relevant Orders    Tdap vaccine, age 7 years and older  (BOOSTRIX)              Assessment  Elevated high-sensitivity CRP-unsure of etiology  Chronic occasional episodes of discomfort in the central chest region-may be secondary to gastroesophageal reflux.  COVID-19-in February or March 2024  Allergic rhinitis  Chronic sinusitis  Rhinoplasty with septal repair May 21, 2015  Gastroesophageal reflux  Gastritis- December 2020  Tubular adenoma August 2015  Uterine leiomyoma-status post total abdominal hysterectomy with salpingo-oophorectomy January 8, 2014  Intermittent episodes of pain-unable to describe-in the left shoulder region and left upper arm region-likely secondary to rotator cuff tendinitis left shoulder  Rotator cuff tendinitis left shoulder July 24, 2024  Vitamin D deficiency  Varicose veins  Acne vulgaris  Seborrheic dermatitis  Mild acne rosacea  Sebaceous hyperplasia of the face  Milia right medial canthus  Chronic generalized fatigue-unsure of etiology.  May be secondary to depression, anxiety.  Anemia and thyroid disease must be ruled out.  Administration of propranolol was probably playing a role.  Chronic intermittent episodes of aching pain in the forehead region-likely secondary to chronic daily headache  Chronic daily headache secondary to chronic migraine overlapping with chronic tension type headache  Rebound analgesic headaches  Right-sided occipital neuralgia    Mild bilateral neuroforaminal stenosis L4-L5, L3-L4,  Mild central canal stenosis L4-L5  Anxiety  Depression    Plan  Obtain CBC differential, CMP, fasting lipid profile, TSH, vitamin D level, vitamin B12 level, cardiac CRP as soon as possible  Obtain EKG and urinalysis  today.  Administer 1 dose of Tdap today.  I did recommend that the patient receive 2 doses of Shingrix over the next 6 months.  I did tell the patient that she could apply Voltaren gel to the left shoulder region every 6 hours as needed.  She will continue all of her current medications for now pending the results of lab work.  The patient will return for a complete physical examination in 1 year

## 2024-08-07 ENCOUNTER — LAB (OUTPATIENT)
Dept: LAB | Facility: LAB | Age: 49
End: 2024-08-07
Payer: COMMERCIAL

## 2024-08-07 ENCOUNTER — APPOINTMENT (OUTPATIENT)
Dept: PRIMARY CARE | Facility: CLINIC | Age: 49
End: 2024-08-07
Payer: COMMERCIAL

## 2024-08-07 VITALS
SYSTOLIC BLOOD PRESSURE: 108 MMHG | BODY MASS INDEX: 23 KG/M2 | HEART RATE: 104 BPM | TEMPERATURE: 97.8 F | DIASTOLIC BLOOD PRESSURE: 70 MMHG | WEIGHT: 125 LBS | HEIGHT: 62 IN

## 2024-08-07 DIAGNOSIS — F41.9 ANXIETY: ICD-10-CM

## 2024-08-07 DIAGNOSIS — G43.009 MIGRAINE WITHOUT AURA AND WITHOUT STATUS MIGRAINOSUS, NOT INTRACTABLE: ICD-10-CM

## 2024-08-07 DIAGNOSIS — J30.9 ALLERGIC RHINITIS, UNSPECIFIED SEASONALITY, UNSPECIFIED TRIGGER: ICD-10-CM

## 2024-08-07 DIAGNOSIS — K21.9 GASTROESOPHAGEAL REFLUX DISEASE WITHOUT ESOPHAGITIS: ICD-10-CM

## 2024-08-07 DIAGNOSIS — G43.719 CHRONIC MIGRAINE WITHOUT AURA, INTRACTABLE, WITHOUT STATUS MIGRAINOSUS: ICD-10-CM

## 2024-08-07 DIAGNOSIS — L73.8 SEBACEOUS HYPERPLASIA OF FACE: ICD-10-CM

## 2024-08-07 DIAGNOSIS — K21.9 LPRD (LARYNGOPHARYNGEAL REFLUX DISEASE): ICD-10-CM

## 2024-08-07 DIAGNOSIS — Z00.00 ROUTINE GENERAL MEDICAL EXAMINATION AT A HEALTH CARE FACILITY: Primary | ICD-10-CM

## 2024-08-07 DIAGNOSIS — Z23 ENCOUNTER FOR IMMUNIZATION: ICD-10-CM

## 2024-08-07 LAB
25(OH)D3 SERPL-MCNC: 31 NG/ML (ref 30–100)
ALBUMIN SERPL BCP-MCNC: 4.3 G/DL (ref 3.4–5)
ALP SERPL-CCNC: 78 U/L (ref 33–110)
ALT SERPL W P-5'-P-CCNC: 11 U/L (ref 7–45)
ANION GAP SERPL CALC-SCNC: 14 MMOL/L (ref 10–20)
AST SERPL W P-5'-P-CCNC: 14 U/L (ref 9–39)
BASOPHILS # BLD AUTO: 0.05 X10*3/UL (ref 0–0.1)
BASOPHILS NFR BLD AUTO: 0.7 %
BILIRUB SERPL-MCNC: 0.6 MG/DL (ref 0–1.2)
BUN SERPL-MCNC: 11 MG/DL (ref 6–23)
CALCIUM SERPL-MCNC: 10.1 MG/DL (ref 8.6–10.6)
CHLORIDE SERPL-SCNC: 101 MMOL/L (ref 98–107)
CHOLEST SERPL-MCNC: 231 MG/DL (ref 0–199)
CHOLESTEROL/HDL RATIO: 3.3
CO2 SERPL-SCNC: 28 MMOL/L (ref 21–32)
CREAT SERPL-MCNC: 0.69 MG/DL (ref 0.5–1.05)
CRP SERPL HS-MCNC: 4.9 MG/L
EGFRCR SERPLBLD CKD-EPI 2021: >90 ML/MIN/1.73M*2
EOSINOPHIL # BLD AUTO: 0.18 X10*3/UL (ref 0–0.7)
EOSINOPHIL NFR BLD AUTO: 2.7 %
ERYTHROCYTE [DISTWIDTH] IN BLOOD BY AUTOMATED COUNT: 13.8 % (ref 11.5–14.5)
GLUCOSE SERPL-MCNC: 75 MG/DL (ref 74–99)
HCT VFR BLD AUTO: 39.8 % (ref 36–46)
HDLC SERPL-MCNC: 69.4 MG/DL
HGB BLD-MCNC: 12.6 G/DL (ref 12–16)
IMM GRANULOCYTES # BLD AUTO: 0.01 X10*3/UL (ref 0–0.7)
IMM GRANULOCYTES NFR BLD AUTO: 0.1 % (ref 0–0.9)
LDLC SERPL CALC-MCNC: 115 MG/DL
LYMPHOCYTES # BLD AUTO: 1.95 X10*3/UL (ref 1.2–4.8)
LYMPHOCYTES NFR BLD AUTO: 29.2 %
MCH RBC QN AUTO: 29.2 PG (ref 26–34)
MCHC RBC AUTO-ENTMCNC: 31.7 G/DL (ref 32–36)
MCV RBC AUTO: 92 FL (ref 80–100)
MONOCYTES # BLD AUTO: 0.33 X10*3/UL (ref 0.1–1)
MONOCYTES NFR BLD AUTO: 4.9 %
NEUTROPHILS # BLD AUTO: 4.16 X10*3/UL (ref 1.2–7.7)
NEUTROPHILS NFR BLD AUTO: 62.4 %
NON HDL CHOLESTEROL: 162 MG/DL (ref 0–149)
NRBC BLD-RTO: 0 /100 WBCS (ref 0–0)
PLATELET # BLD AUTO: 523 X10*3/UL (ref 150–450)
POC APPEARANCE, URINE: CLEAR
POC BILIRUBIN, URINE: NEGATIVE
POC BLOOD, URINE: NEGATIVE
POC COLOR, URINE: YELLOW
POC GLUCOSE, URINE: NEGATIVE MG/DL
POC KETONES, URINE: NEGATIVE MG/DL
POC LEUKOCYTES, URINE: NEGATIVE
POC NITRITE,URINE: NEGATIVE
POC PH, URINE: 5.5 PH
POC PROTEIN, URINE: NEGATIVE MG/DL
POC SPECIFIC GRAVITY, URINE: 1.02
POC UROBILINOGEN, URINE: 0.2 EU/DL
POTASSIUM SERPL-SCNC: 4.2 MMOL/L (ref 3.5–5.3)
PROT SERPL-MCNC: 7.6 G/DL (ref 6.4–8.2)
RBC # BLD AUTO: 4.31 X10*6/UL (ref 4–5.2)
SODIUM SERPL-SCNC: 139 MMOL/L (ref 136–145)
TRIGL SERPL-MCNC: 234 MG/DL (ref 0–149)
TSH SERPL-ACNC: 1.81 MIU/L (ref 0.44–3.98)
VIT B12 SERPL-MCNC: 403 PG/ML (ref 211–911)
VLDL: 47 MG/DL (ref 0–40)
WBC # BLD AUTO: 6.7 X10*3/UL (ref 4.4–11.3)

## 2024-08-07 PROCEDURE — 93000 ELECTROCARDIOGRAM COMPLETE: CPT | Performed by: INTERNAL MEDICINE

## 2024-08-07 PROCEDURE — 90715 TDAP VACCINE 7 YRS/> IM: CPT | Performed by: INTERNAL MEDICINE

## 2024-08-07 PROCEDURE — 90471 IMMUNIZATION ADMIN: CPT | Performed by: INTERNAL MEDICINE

## 2024-08-07 PROCEDURE — 80061 LIPID PANEL: CPT

## 2024-08-07 PROCEDURE — 80053 COMPREHEN METABOLIC PANEL: CPT

## 2024-08-07 PROCEDURE — 84443 ASSAY THYROID STIM HORMONE: CPT

## 2024-08-07 PROCEDURE — 82306 VITAMIN D 25 HYDROXY: CPT

## 2024-08-07 PROCEDURE — 99213 OFFICE O/P EST LOW 20 MIN: CPT | Performed by: INTERNAL MEDICINE

## 2024-08-07 PROCEDURE — 99396 PREV VISIT EST AGE 40-64: CPT | Performed by: INTERNAL MEDICINE

## 2024-08-07 PROCEDURE — 36415 COLL VENOUS BLD VENIPUNCTURE: CPT

## 2024-08-07 PROCEDURE — 86141 C-REACTIVE PROTEIN HS: CPT

## 2024-08-07 PROCEDURE — 81002 URINALYSIS NONAUTO W/O SCOPE: CPT | Performed by: INTERNAL MEDICINE

## 2024-08-07 PROCEDURE — 85025 COMPLETE CBC W/AUTO DIFF WBC: CPT

## 2024-08-07 PROCEDURE — 3008F BODY MASS INDEX DOCD: CPT | Performed by: INTERNAL MEDICINE

## 2024-08-07 PROCEDURE — 82607 VITAMIN B-12: CPT

## 2024-08-07 RX ORDER — PANTOPRAZOLE SODIUM 20 MG/1
20 TABLET, DELAYED RELEASE ORAL
COMMUNITY

## 2024-08-07 RX ORDER — ISOTRETINOIN 40 MG/1
CAPSULE ORAL
Qty: 30 CAPSULE | Refills: 0 | Status: SHIPPED | OUTPATIENT
Start: 2024-08-07

## 2024-08-07 RX ORDER — ATOGEPANT 60 MG/1
60 TABLET ORAL DAILY
COMMUNITY

## 2024-08-07 RX ORDER — DULOXETIN HYDROCHLORIDE 60 MG/1
60 CAPSULE, DELAYED RELEASE ORAL
COMMUNITY
Start: 2024-03-20 | End: 2025-03-20

## 2024-08-07 RX ORDER — DULOXETIN HYDROCHLORIDE 30 MG/1
30 CAPSULE, DELAYED RELEASE ORAL DAILY
COMMUNITY
Start: 2024-07-08

## 2024-08-08 ENCOUNTER — TELEPHONE (OUTPATIENT)
Dept: PRIMARY CARE | Facility: CLINIC | Age: 49
End: 2024-08-08
Payer: COMMERCIAL

## 2024-08-08 NOTE — TELEPHONE ENCOUNTER
I left a message for the patient to call me back.  We will consider starting her on a fish oil supplement daily.  Alternatively she may increase her fish intake.  We will repeat a fasting lipid profile in 6 months

## 2024-08-12 ENCOUNTER — APPOINTMENT (OUTPATIENT)
Dept: DERMATOLOGY | Facility: CLINIC | Age: 49
End: 2024-08-12
Payer: COMMERCIAL

## 2024-08-12 DIAGNOSIS — L73.8 SEBACEOUS HYPERPLASIA OF FACE: ICD-10-CM

## 2024-08-12 PROCEDURE — 99213 OFFICE O/P EST LOW 20 MIN: CPT

## 2024-08-13 ENCOUNTER — APPOINTMENT (OUTPATIENT)
Dept: RADIOLOGY | Facility: HOSPITAL | Age: 49
End: 2024-08-13
Payer: COMMERCIAL

## 2024-08-19 RX ORDER — ISOTRETINOIN 20 MG/1
CAPSULE ORAL
Qty: 120 CAPSULE | Refills: 0 | Status: SHIPPED | OUTPATIENT
Start: 2024-08-19

## 2024-08-19 NOTE — PROGRESS NOTES
Subjective   HPI: Shyann Huerta is a 49 y.o. female who presents in office for evaluation and treatment of sebaceous hyperplasia being treated with Accutane..     ROS: No other skin or systemic complaints other than what is documented elsewhere in the note.    ALLERGIES: Macrolide antibiotics, Azithromycin, Cephalexin, and Erythromycin base    SOCIAL:  reports that she has never smoked. She has never used smokeless tobacco. No history on file for alcohol use and drug use.    Objective   Head - Anterior (Face)  Retinization of bilateral malar cheeks present. Slight xerosis around the mouth. Patient's numerous pearlescent papules have resolved with therapy         Assessment/Plan   Sebaceous hyperplasia of face  Head - Anterior (Face)    Patient is on isotretinoin 40 mg daily for sebaceous hyperplasia.  I discussed trying isotretinoin 20 mg Monday Wednesday Friday instead.  Patient is agreeable to this plan.  Labs ordered.  Refill sent.    Related Medications  ISOtretinoin (Accutane) 20 mg capsule  Take 1 capsule By mouth twice daily         FOLLOW UP: 3 months-sooner if needed    The patient was encouraged to contact me with any further questions or concerns.  Xin Fuentes PA-C  8/19/2024

## 2024-08-22 ENCOUNTER — TELEPHONE (OUTPATIENT)
Dept: DERMATOLOGY | Facility: CLINIC | Age: 49
End: 2024-08-22

## 2024-08-28 ENCOUNTER — HOSPITAL ENCOUNTER (OUTPATIENT)
Dept: RADIOLOGY | Facility: HOSPITAL | Age: 49
Discharge: HOME | End: 2024-08-28
Payer: COMMERCIAL

## 2024-08-28 DIAGNOSIS — Z12.39 BREAST CANCER SCREENING, HIGH RISK PATIENT: ICD-10-CM

## 2024-08-28 DIAGNOSIS — Z80.3 FAMILY HISTORY OF BREAST CANCER: ICD-10-CM

## 2024-08-28 PROCEDURE — A9575 INJ GADOTERATE MEGLUMI 0.1ML: HCPCS | Performed by: NURSE PRACTITIONER

## 2024-08-28 PROCEDURE — 77049 MRI BREAST C-+ W/CAD BI: CPT

## 2024-08-28 PROCEDURE — 2550000001 HC RX 255 CONTRASTS: Performed by: NURSE PRACTITIONER

## 2024-08-28 PROCEDURE — 77049 MRI BREAST C-+ W/CAD BI: CPT | Performed by: STUDENT IN AN ORGANIZED HEALTH CARE EDUCATION/TRAINING PROGRAM

## 2024-08-28 RX ORDER — GADOTERATE MEGLUMINE 376.9 MG/ML
9 INJECTION INTRAVENOUS
Status: COMPLETED | OUTPATIENT
Start: 2024-08-28 | End: 2024-08-28

## 2024-09-24 DIAGNOSIS — J01.91 ACUTE RECURRENT SINUSITIS, UNSPECIFIED LOCATION: Primary | ICD-10-CM

## 2024-09-26 ENCOUNTER — APPOINTMENT (OUTPATIENT)
Dept: DERMATOLOGY | Facility: CLINIC | Age: 49
End: 2024-09-26
Payer: COMMERCIAL

## 2025-01-05 NOTE — PROGRESS NOTES
Subjective   Patient ID: Shyann Huerta is a 49 y.o. female who presents for neck mass    HPI     Review of Systems    Objective   There were no vitals taken for this visit.    Physical Exam    Assessment/Plan   {Assess/PlanSmartLinks:[id number]}

## 2025-01-06 ENCOUNTER — APPOINTMENT (OUTPATIENT)
Dept: PRIMARY CARE | Facility: CLINIC | Age: 50
End: 2025-01-06
Payer: COMMERCIAL

## 2025-01-06 DIAGNOSIS — R22.1 NECK MASS: Primary | ICD-10-CM

## 2025-01-20 ENCOUNTER — APPOINTMENT (OUTPATIENT)
Dept: PRIMARY CARE | Facility: CLINIC | Age: 50
End: 2025-01-20
Payer: COMMERCIAL

## 2025-01-22 ENCOUNTER — APPOINTMENT (OUTPATIENT)
Dept: RADIOLOGY | Facility: CLINIC | Age: 50
End: 2025-01-22
Payer: COMMERCIAL

## 2025-02-04 NOTE — PROGRESS NOTES
Shyann Huerta female   1975 49 y.o.   [id number]      Chief Complaint    Follow-up            HPI  Shyann Huerta is a 49 y.o.   [facility]. She has history of benign left breast biopsy in the . She recently completed genetic testing and is negative for gene mutations. She has Ashkenazi Hinduism descent & tested BRCA negative and Cancer Next Panel negative 2019. She had complete hysterectomy for fibroids (no atypia or malignancy) and has been on HRT for years. 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 She has significant family history of breast cancer with mom diagnosed age 58 BRCA-positive (unsure if UADA0rl BRCA2), maternal aunt age 38, maternal aunt age 78, maternal great aunt age 50, paternal aunt age 72, paternal grandmother age 65.     She went off HRT and did not feel well, had depression She returned to small dose of HRT/patch and feels much better. She going through a divorce and has lost weight. She is on several medications and wants to wean off little by little. She is seeing a psychiatrist.      BREAST IMAGIN2024 Full protocol breast MRI, BI-RADS Category 1. 2024 bilateral screening mammogram normal, BI-RADS Category 1.      FEMALE HISTORY: Menarche age 10, , first birth age 34, OCPs x 15 years, surgical menopause age 38, HRT x 10 years and current user, estrogen only, 1 benign breast biopsy, scattered fibroglandular breast tissue     FAMILY CANCER HISTORY:  Mother: Breast cancer age 58, BRCA positive  Maternal aunt: Breast cancer age 38  Maternal aunt: breast cancer age 78  Maternal great aunt: Breast cancer age 50  Paternal aunt: breast cancer age 72  Paternal grandmother: breast cancer age 65       REVIEW OF SYSTEMS    Constitutional:  Negative for appetite change, fatigue, fever and unexpected weight change.   HENT:  Negative  for ear pain, hearing loss, nosebleeds, sore throat and trouble swallowing.    Eyes:  Negative for discharge, itching and visual disturbance.   Respiratory:  Negative for cough, chest tightness and shortness of breath.    Cardiovascular:  Negative for chest pain, palpitations and leg swelling.   Breast: as indicated in HPI  Gastrointestinal:  Negative for abdominal pain, constipation, diarrhea and nausea.   Endocrine: Negative for cold intolerance and heat intolerance.   Genitourinary:  Negative for dysuria, frequency, hematuria, pelvic pain and vaginal bleeding.   Musculoskeletal:  Negative for arthralgias, back pain, gait problem, joint swelling and myalgias.   Skin:  Negative for color change and rash.   Allergic/Immunologic: Negative for environmental allergies and food allergies.   Neurological:  Negative for dizziness, tremors, speech difficulty, weakness, numbness and headaches.   Hematological:  Does not bruise/bleed easily.   Psychiatric/Behavioral:  Negative for agitation, dysphoric mood and sleep disturbance. The patient is not nervous/anxious.         MEDICATIONS  Current Outpatient Medications   Medication Instructions    aspirin-acetaminophen-caffeine (Excedrin Migraine) 250-250-65 mg tablet 1 tablet, Every 6 hours PRN    DULoxetine (CYMBALTA) 30 mg, Daily    eletriptan (Relpax) 40 mg tablet Take 1 tab at migraine onset. May repeat once in 2 hours if needed.    ISOtretinoin (Accutane) 20 mg capsule Take 1 capsule By mouth twice daily    ondansetron (ZOFRAN) 4 mg, Every 6 hours PRN    pantoprazole (PROTONIX) 20 mg, Daily before breakfast    Qulipta 60 mg, Daily    Vivelle-Dot 0.0375 mg/24 hr Place on the skin.        ALLERGIES  Allergies   Allergen Reactions    Macrolide Antibiotics GI Upset, Other and Nausea/vomiting     zithromax / severe vomiting    Azithromycin Other    Cephalexin GI Upset    Erythromycin Base Other        SOCIAL HISTORY    Family History   Problem Relation Name Age of Onset     Breast cancer Mother      Breast cancer Maternal Grandmother      Breast cancer Paternal Grandmother      Breast cancer Father's Sister      Breast cancer Mother's Sister      Breast cancer Mother's Sister           Social History     Tobacco Use    Smoking status: Never    Smokeless tobacco: Never   Substance Use Topics    Alcohol use: Not on file     Comment: 1 glass of wine every few months        VITALS  Vitals:    02/07/25 0808   BP: 112/75   Pulse: 96   Temp: 36.5 °C (97.7 °F)   SpO2: 99%          PHYSICAL EXAM  Patient is alert and oriented x3, with appropriate mood. The gait is steady and hand grasps are equal. Sclera clear. The breasts are nearly symmetrical. The tissue is soft without palpable abnormalities, discrete nodules or masses. The skin and nipples appear normal. There is no cervical, supraclavicular, or axillary lymphadenopathy palpable. Heart rate and rhythm normal, S1 and S2 appreciated. The lungs are clear bilaterally. Abdomen is soft & non-tender.    Physical Exam     IMAGING  Screening mammogram today, results are pending.   Time was spent viewing digital images of the radiology testing with the patient.     RISK PROFILE      ORDERS  Orders Placed This Encounter   Procedures    MR breast bilateral w contrast full protocol     Standing Status:   Future     Standing Expiration Date:   2/4/2026     Order Specific Question:   Has the Patient had a prior MRI with contrast and had an allergic reaction?     Answer:   No     Order Specific Question:   Previous Mamm performed at  location?     Answer:   Yes     Order Specific Question:   Reason for exam:     Answer:   .     Order Specific Question:   Is the patient pregnant or breast feeding?     Answer:   No     Order Specific Question:   What is the patient's sedation requirement?     Answer:   No Sedation     Order Specific Question:   Select MR Protocol for this Order:     Answer:   MR BREAST     Order Specific Question:   Does the patient have  a Cochlear Implant, Brain Aneurysm Clip, Implanted Nerve or Bone Graft Stimulator, Implanted Breast Tissue Expander, Glucose Monitor or Neulasta Device?     Answer:   No     Order Specific Question:   Radiologist to Determine Optimal Study     Answer:   Yes     Order Specific Question:   Release result to [facility]     Answer:   Immediate [1]     Order Specific Question:   Is this exam part of a Research Study? If Yes, link this order to the research study     Answer:   No    BI mammo bilateral screening tomosynthesis     Standing Status:   Future     Standing Expiration Date:   4/4/2026     Order Specific Question:   Perform a breast ultrasound if clinically indicated by Radiologist?     Answer:   Yes     Order Specific Question:   Previous Mamm performed at  location?     Answer:   Yes     Order Specific Question:   Reason for exam:     Answer:   .     Order Specific Question:   Radiologist to Determine Optimal Study     Answer:   Yes     Order Specific Question:   Release result to [facility]     Answer:   Immediate     Order Specific Question:   Is this exam part of a Research Study? If Yes, link this order to the research study     Answer:   No           ASSESSMENT/PLAN  1. Encounter for screening mammogram for malignant neoplasm of breast  BI mammo bilateral screening tomosynthesis      2. Family history of breast cancer  Clinic Appointment Request      3. Breast cancer screening, high risk patient  MR breast bilateral w contrast full protocol    Clinic Appointment Request    Clinic Appointment Request             Follow up in about 1 year (around 2/7/2026) for with or after recommended imaging.  HIGH RISK PLAN  Yearly mammogram with digital breast tomosynthesis  Twice yearly clinical breast examinations  Breast MRI (to schedule call [phone]) August 2024  Monthly self breast examinations &/or regular self breast awareness  Vitamin D3 2000 IU/daily (over the counter) unless your PCP recommends you take a  specific dose  Exercise 3-4 times per week for 45-60 minutes  Limit alcohol to 3-4 drinks per week if you are menopausal  Eat healthy low-fat diet with lots of vegetable & fruits  Risk model indicate personal risk of breast cancer in the next 5 years and lifetime (age 85-90):  Monika: 5-year risk 3.7% (average 1.3%), lifetime risk 21.9%, (average 9.4%)    She is eligible for endocrine therapy with Tamoxifen, and also Raloxifene or Aromatase inhibitor if/when menopausal. Endocrine therapy reduces lifetime risk of breast cancer by 50% when taken for 5 years. There is an alternative low dose Tamoxifen which can be taken for only 3 years. Endocrine therapy cannot be taken while on HRT.      Ashley Koehler, [facility] [place]

## 2025-02-07 ENCOUNTER — HOSPITAL ENCOUNTER (OUTPATIENT)
Dept: RADIOLOGY | Facility: CLINIC | Age: 50
Discharge: HOME | End: 2025-02-07
Payer: COMMERCIAL

## 2025-02-07 ENCOUNTER — OFFICE VISIT (OUTPATIENT)
Dept: SURGICAL ONCOLOGY | Facility: CLINIC | Age: 50
End: 2025-02-07
Payer: COMMERCIAL

## 2025-02-07 VITALS
SYSTOLIC BLOOD PRESSURE: 112 MMHG | HEART RATE: 96 BPM | WEIGHT: 89 LBS | OXYGEN SATURATION: 99 % | BODY MASS INDEX: 16.28 KG/M2 | TEMPERATURE: 97.7 F | DIASTOLIC BLOOD PRESSURE: 75 MMHG

## 2025-02-07 DIAGNOSIS — J01.91 ACUTE RECURRENT SINUSITIS, UNSPECIFIED LOCATION: ICD-10-CM

## 2025-02-07 DIAGNOSIS — Z80.3 FAMILY HISTORY OF BREAST CANCER: ICD-10-CM

## 2025-02-07 DIAGNOSIS — Z12.31 ENCOUNTER FOR SCREENING MAMMOGRAM FOR MALIGNANT NEOPLASM OF BREAST: Primary | ICD-10-CM

## 2025-02-07 DIAGNOSIS — Z12.39 BREAST CANCER SCREENING, HIGH RISK PATIENT: ICD-10-CM

## 2025-02-07 DIAGNOSIS — Z00.00 HEALTHCARE MAINTENANCE: ICD-10-CM

## 2025-02-07 PROCEDURE — 70486 CT MAXILLOFACIAL W/O DYE: CPT

## 2025-02-07 PROCEDURE — 77067 SCR MAMMO BI INCL CAD: CPT

## 2025-02-07 PROCEDURE — 99214 OFFICE O/P EST MOD 30 MIN: CPT | Performed by: NURSE PRACTITIONER

## 2025-02-07 ASSESSMENT — PAIN SCALES - GENERAL: PAINLEVEL_OUTOF10: 0-NO PAIN

## 2025-08-09 RX ORDER — NAPROXEN 500 MG/1
500 TABLET ORAL
COMMUNITY
Start: 2024-08-12 | End: 2025-08-11 | Stop reason: WASHOUT

## 2025-08-09 RX ORDER — BUPROPION HYDROCHLORIDE 300 MG/1
300 TABLET ORAL
COMMUNITY
Start: 2025-05-22 | End: 2025-08-11 | Stop reason: DRUGHIGH

## 2025-08-09 RX ORDER — AZELASTINE 1 MG/ML
2 SPRAY, METERED NASAL 2 TIMES DAILY
COMMUNITY
Start: 2025-04-02

## 2025-08-11 ENCOUNTER — APPOINTMENT (OUTPATIENT)
Dept: PRIMARY CARE | Facility: CLINIC | Age: 50
End: 2025-08-11
Payer: COMMERCIAL

## 2025-08-11 VITALS
BODY MASS INDEX: 16.6 KG/M2 | DIASTOLIC BLOOD PRESSURE: 68 MMHG | HEIGHT: 62 IN | TEMPERATURE: 97.9 F | HEART RATE: 92 BPM | SYSTOLIC BLOOD PRESSURE: 106 MMHG | WEIGHT: 90.2 LBS

## 2025-08-11 DIAGNOSIS — K21.9 LPRD (LARYNGOPHARYNGEAL REFLUX DISEASE): ICD-10-CM

## 2025-08-11 DIAGNOSIS — K21.9 GASTROESOPHAGEAL REFLUX DISEASE WITHOUT ESOPHAGITIS: ICD-10-CM

## 2025-08-11 DIAGNOSIS — Z00.00 ROUTINE GENERAL MEDICAL EXAMINATION AT A HEALTH CARE FACILITY: Primary | ICD-10-CM

## 2025-08-11 DIAGNOSIS — G43.719 CHRONIC MIGRAINE WITHOUT AURA, INTRACTABLE, WITHOUT STATUS MIGRAINOSUS: ICD-10-CM

## 2025-08-11 DIAGNOSIS — J30.9 ALLERGIC RHINITIS, UNSPECIFIED SEASONALITY, UNSPECIFIED TRIGGER: ICD-10-CM

## 2025-08-11 LAB
POC APPEARANCE, URINE: ABNORMAL
POC BILIRUBIN, URINE: NEGATIVE
POC BLOOD, URINE: NEGATIVE
POC COLOR, URINE: YELLOW
POC GLUCOSE, URINE: NEGATIVE MG/DL
POC KETONES, URINE: NEGATIVE MG/DL
POC LEUKOCYTES, URINE: NEGATIVE
POC NITRITE,URINE: NEGATIVE
POC PH, URINE: 7 PH
POC PROTEIN, URINE: NEGATIVE MG/DL
POC SPECIFIC GRAVITY, URINE: 1.02
POC UROBILINOGEN, URINE: 0.2 EU/DL

## 2025-08-11 PROCEDURE — 81002 URINALYSIS NONAUTO W/O SCOPE: CPT | Performed by: INTERNAL MEDICINE

## 2025-08-11 PROCEDURE — 99396 PREV VISIT EST AGE 40-64: CPT | Performed by: INTERNAL MEDICINE

## 2025-08-11 PROCEDURE — 1036F TOBACCO NON-USER: CPT | Performed by: INTERNAL MEDICINE

## 2025-08-11 PROCEDURE — 99213 OFFICE O/P EST LOW 20 MIN: CPT | Performed by: INTERNAL MEDICINE

## 2025-08-11 PROCEDURE — 3008F BODY MASS INDEX DOCD: CPT | Performed by: INTERNAL MEDICINE

## 2025-08-11 PROCEDURE — 93000 ELECTROCARDIOGRAM COMPLETE: CPT | Performed by: INTERNAL MEDICINE

## 2025-08-11 RX ORDER — DULOXETIN HYDROCHLORIDE 30 MG/1
60 CAPSULE, DELAYED RELEASE ORAL DAILY
Qty: 90 CAPSULE | Refills: 1 | Status: SHIPPED | OUTPATIENT
Start: 2025-08-11

## 2025-08-11 RX ORDER — ESTRADIOL 0.04 MG/D
1 PATCH, EXTENDED RELEASE TRANSDERMAL 2 TIMES WEEKLY
Qty: 90 PATCH | Refills: 1 | Status: SHIPPED | OUTPATIENT
Start: 2025-08-11

## 2025-08-11 RX ORDER — BUPROPION HYDROCHLORIDE 150 MG/1
150 TABLET ORAL
Qty: 90 TABLET | Refills: 1 | Status: SHIPPED | OUTPATIENT
Start: 2025-08-11

## 2025-08-12 ENCOUNTER — RESULTS FOLLOW-UP (OUTPATIENT)
Dept: PRIMARY CARE | Facility: CLINIC | Age: 50
End: 2025-08-12
Payer: COMMERCIAL

## 2025-08-12 DIAGNOSIS — E87.5 HYPERKALEMIA: Primary | ICD-10-CM

## 2025-08-12 LAB
25(OH)D3+25(OH)D2 SERPL-MCNC: 36 NG/ML (ref 30–100)
ALBUMIN SERPL-MCNC: 4.6 G/DL (ref 3.6–5.1)
ALP SERPL-CCNC: 62 U/L (ref 37–153)
ALT SERPL-CCNC: 21 U/L (ref 6–29)
ANION GAP SERPL CALCULATED.4IONS-SCNC: 7 MMOL/L (CALC) (ref 7–17)
AST SERPL-CCNC: 18 U/L (ref 10–35)
BASOPHILS # BLD AUTO: 50 CELLS/UL (ref 0–200)
BASOPHILS NFR BLD AUTO: 1.2 %
BILIRUB SERPL-MCNC: 0.5 MG/DL (ref 0.2–1.2)
BUN SERPL-MCNC: 20 MG/DL (ref 7–25)
CALCIUM SERPL-MCNC: 9.5 MG/DL (ref 8.6–10.4)
CHLORIDE SERPL-SCNC: 103 MMOL/L (ref 98–110)
CHOLEST SERPL-MCNC: 187 MG/DL
CHOLEST/HDLC SERPL: 1.9 (CALC)
CO2 SERPL-SCNC: 26 MMOL/L (ref 20–32)
CREAT SERPL-MCNC: 0.76 MG/DL (ref 0.5–1.03)
CRP SERPL HS-MCNC: <0.2 MG/L
EGFRCR SERPLBLD CKD-EPI 2021: 95 ML/MIN/1.73M2
EOSINOPHIL # BLD AUTO: 160 CELLS/UL (ref 15–500)
EOSINOPHIL NFR BLD AUTO: 3.8 %
ERYTHROCYTE [DISTWIDTH] IN BLOOD BY AUTOMATED COUNT: 13.8 % (ref 11–15)
GLUCOSE SERPL-MCNC: 78 MG/DL (ref 65–99)
HCT VFR BLD AUTO: 40.2 % (ref 35–45)
HDLC SERPL-MCNC: 97 MG/DL
HGB BLD-MCNC: 12.7 G/DL (ref 11.7–15.5)
HIV 1+2 AB+HIV1 P24 AG SERPL QL IA: NORMAL
HIV 1+2 AB+HIV1 P24 AG SERPL QL IA: NORMAL
LDLC SERPL CALC-MCNC: 73 MG/DL (CALC)
LYMPHOCYTES # BLD AUTO: 2071 CELLS/UL (ref 850–3900)
LYMPHOCYTES NFR BLD AUTO: 49.3 %
MCH RBC QN AUTO: 30.5 PG (ref 27–33)
MCHC RBC AUTO-ENTMCNC: 31.6 G/DL (ref 32–36)
MCV RBC AUTO: 96.6 FL (ref 80–100)
MONOCYTES # BLD AUTO: 344 CELLS/UL (ref 200–950)
MONOCYTES NFR BLD AUTO: 8.2 %
NEUTROPHILS # BLD AUTO: 1575 CELLS/UL (ref 1500–7800)
NEUTROPHILS NFR BLD AUTO: 37.5 %
NONHDLC SERPL-MCNC: 90 MG/DL (CALC)
PLATELET # BLD AUTO: 428 THOUSAND/UL (ref 140–400)
PMV BLD REES-ECKER: 8.9 FL (ref 7.5–12.5)
POTASSIUM SERPL-SCNC: 5.9 MMOL/L (ref 3.5–5.3)
PROT SERPL-MCNC: 7.3 G/DL (ref 6.1–8.1)
RBC # BLD AUTO: 4.16 MILLION/UL (ref 3.8–5.1)
SODIUM SERPL-SCNC: 136 MMOL/L (ref 135–146)
TRIGL SERPL-MCNC: 85 MG/DL
TSH SERPL-ACNC: 1.96 MIU/L
VIT B12 SERPL-MCNC: 769 PG/ML (ref 200–1100)
WBC # BLD AUTO: 4.2 THOUSAND/UL (ref 3.8–10.8)

## 2025-08-18 DIAGNOSIS — E87.5 HYPERKALEMIA: ICD-10-CM

## 2025-08-19 LAB
ANION GAP SERPL CALCULATED.4IONS-SCNC: 8 MMOL/L (CALC) (ref 7–17)
BUN SERPL-MCNC: 14 MG/DL (ref 7–25)
BUN/CREAT SERPL: NORMAL (CALC) (ref 6–22)
CALCIUM SERPL-MCNC: 9.5 MG/DL (ref 8.6–10.4)
CHLORIDE SERPL-SCNC: 101 MMOL/L (ref 98–110)
CO2 SERPL-SCNC: 26 MMOL/L (ref 20–32)
CREAT SERPL-MCNC: 0.7 MG/DL (ref 0.5–1.03)
EGFRCR SERPLBLD CKD-EPI 2021: 105 ML/MIN/1.73M2
GLUCOSE SERPL-MCNC: 83 MG/DL (ref 65–99)
POTASSIUM SERPL-SCNC: 5.2 MMOL/L (ref 3.5–5.3)
SODIUM SERPL-SCNC: 135 MMOL/L (ref 135–146)

## 2025-09-03 ENCOUNTER — APPOINTMENT (OUTPATIENT)
Dept: RADIOLOGY | Facility: HOSPITAL | Age: 50
End: 2025-09-03
Payer: COMMERCIAL

## 2026-08-12 ENCOUNTER — APPOINTMENT (OUTPATIENT)
Dept: PRIMARY CARE | Facility: CLINIC | Age: 51
End: 2026-08-12
Payer: COMMERCIAL